# Patient Record
Sex: MALE | Race: WHITE | Employment: FULL TIME | URBAN - NONMETROPOLITAN AREA
[De-identification: names, ages, dates, MRNs, and addresses within clinical notes are randomized per-mention and may not be internally consistent; named-entity substitution may affect disease eponyms.]

---

## 2019-06-04 ENCOUNTER — OFFICE VISIT (OUTPATIENT)
Dept: FAMILY MEDICINE CLINIC | Age: 29
End: 2019-06-04
Payer: COMMERCIAL

## 2019-06-04 VITALS
OXYGEN SATURATION: 99 % | HEIGHT: 70 IN | HEART RATE: 67 BPM | WEIGHT: 187 LBS | TEMPERATURE: 98.4 F | RESPIRATION RATE: 16 BRPM | BODY MASS INDEX: 26.77 KG/M2 | SYSTOLIC BLOOD PRESSURE: 120 MMHG | DIASTOLIC BLOOD PRESSURE: 84 MMHG

## 2019-06-04 DIAGNOSIS — Z13.220 SCREENING CHOLESTEROL LEVEL: ICD-10-CM

## 2019-06-04 DIAGNOSIS — G47.9 DISTURBANCE IN SLEEP BEHAVIOR: ICD-10-CM

## 2019-06-04 DIAGNOSIS — L40.9 PSORIASIS: ICD-10-CM

## 2019-06-04 DIAGNOSIS — L40.9 PSORIASIS: Primary | ICD-10-CM

## 2019-06-04 DIAGNOSIS — F11.11 H/O OPIOID ABUSE (HCC): ICD-10-CM

## 2019-06-04 LAB
ALBUMIN SERPL-MCNC: 5.3 G/DL (ref 3.5–5.2)
ALP BLD-CCNC: 73 U/L (ref 40–130)
ALT SERPL-CCNC: 46 U/L (ref 5–41)
ANION GAP SERPL CALCULATED.3IONS-SCNC: 16 MMOL/L (ref 7–19)
AST SERPL-CCNC: 28 U/L (ref 5–40)
BASOPHILS ABSOLUTE: 0.1 K/UL (ref 0–0.2)
BASOPHILS RELATIVE PERCENT: 0.8 % (ref 0–1)
BILIRUB SERPL-MCNC: 0.5 MG/DL (ref 0.2–1.2)
BUN BLDV-MCNC: 13 MG/DL (ref 6–20)
CALCIUM SERPL-MCNC: 10.1 MG/DL (ref 8.6–10)
CHLORIDE BLD-SCNC: 101 MMOL/L (ref 98–111)
CHOLESTEROL, TOTAL: 265 MG/DL (ref 160–199)
CO2: 25 MMOL/L (ref 22–29)
CREAT SERPL-MCNC: 0.9 MG/DL (ref 0.5–1.2)
EOSINOPHILS ABSOLUTE: 0 K/UL (ref 0–0.6)
EOSINOPHILS RELATIVE PERCENT: 0.4 % (ref 0–5)
GFR NON-AFRICAN AMERICAN: >60
GLUCOSE BLD-MCNC: 87 MG/DL (ref 74–109)
HCT VFR BLD CALC: 45.2 % (ref 42–52)
HDLC SERPL-MCNC: 64 MG/DL (ref 55–121)
HEMOGLOBIN: 14.6 G/DL (ref 14–18)
LDL CHOLESTEROL CALCULATED: 188 MG/DL
LYMPHOCYTES ABSOLUTE: 1.7 K/UL (ref 1.1–4.5)
LYMPHOCYTES RELATIVE PERCENT: 18.1 % (ref 20–40)
MCH RBC QN AUTO: 28.3 PG (ref 27–31)
MCHC RBC AUTO-ENTMCNC: 32.3 G/DL (ref 33–37)
MCV RBC AUTO: 87.8 FL (ref 80–94)
MONOCYTES ABSOLUTE: 0.6 K/UL (ref 0–0.9)
MONOCYTES RELATIVE PERCENT: 6 % (ref 0–10)
NEUTROPHILS ABSOLUTE: 7 K/UL (ref 1.5–7.5)
NEUTROPHILS RELATIVE PERCENT: 74.3 % (ref 50–65)
PDW BLD-RTO: 12.9 % (ref 11.5–14.5)
PLATELET # BLD: 247 K/UL (ref 130–400)
PMV BLD AUTO: 10.1 FL (ref 9.4–12.4)
POTASSIUM SERPL-SCNC: 4 MMOL/L (ref 3.5–5)
RBC # BLD: 5.15 M/UL (ref 4.7–6.1)
SODIUM BLD-SCNC: 142 MMOL/L (ref 136–145)
TOTAL PROTEIN: 8.1 G/DL (ref 6.6–8.7)
TRIGL SERPL-MCNC: 66 MG/DL (ref 0–149)
WBC # BLD: 9.5 K/UL (ref 4.8–10.8)

## 2019-06-04 PROCEDURE — 99204 OFFICE O/P NEW MOD 45 MIN: CPT | Performed by: FAMILY MEDICINE

## 2019-06-04 RX ORDER — CLOBETASOL PROPIONATE 0.5 MG/G
CREAM TOPICAL
Qty: 1 TUBE | Refills: 2 | Status: SHIPPED | OUTPATIENT
Start: 2019-06-04 | End: 2020-01-16

## 2019-06-04 RX ORDER — M-VIT,TX,IRON,MINS/CALC/FOLIC 27MG-0.4MG
1 TABLET ORAL DAILY
COMMUNITY

## 2019-06-04 RX ORDER — TRAZODONE HYDROCHLORIDE 50 MG/1
TABLET ORAL
Qty: 60 TABLET | Refills: 2 | Status: SHIPPED | OUTPATIENT
Start: 2019-06-04 | End: 2022-03-24

## 2019-06-04 SDOH — HEALTH STABILITY: MENTAL HEALTH: HOW OFTEN DO YOU HAVE A DRINK CONTAINING ALCOHOL?: 2-4 TIMES A MONTH

## 2019-06-04 ASSESSMENT — PATIENT HEALTH QUESTIONNAIRE - PHQ9
1. LITTLE INTEREST OR PLEASURE IN DOING THINGS: 0
SUM OF ALL RESPONSES TO PHQ9 QUESTIONS 1 & 2: 0
2. FEELING DOWN, DEPRESSED OR HOPELESS: 0
SUM OF ALL RESPONSES TO PHQ QUESTIONS 1-9: 0
SUM OF ALL RESPONSES TO PHQ QUESTIONS 1-9: 0

## 2019-06-04 ASSESSMENT — ENCOUNTER SYMPTOMS
COUGH: 0
BACK PAIN: 0
EYE PAIN: 0
DIARRHEA: 0
VOMITING: 0
EYE DISCHARGE: 0
ABDOMINAL PAIN: 0
NAUSEA: 0
SHORTNESS OF BREATH: 0
RHINORRHEA: 0
CONSTIPATION: 0

## 2019-06-04 NOTE — PATIENT INSTRUCTIONS
sunburns can trigger psoriasis. Use sunscreen on areas of your skin that do not have psoriasis. Make sure to use a broad-spectrum sunscreen that has a sun protection factor (SPF) of 30 or higher. Use it every day, even when it is cloudy. ? Take care to avoid accidents such as cutting or scraping your skin. An injury to the skin can cause psoriasis patches to form anywhere on the body, including the area of the injury. ? Avoid tight shoes, clothing, watchbands, and hats. These may irritate your skin. ? Use a vaporizer or humidifier to add moisture to your bedroom. Follow the directions for cleaning the machine. · Try making one or more changes to your daily habits to help with managing your psoriasis. For example:  ? Try to control stress and anxiety. They may cause psoriasis to appear suddenly or can make symptoms worse. ? If you smoke, think about quitting. If you need help quitting, talk to your doctor about stop-smoking programs and medicines. ? If you drink, limit or reduce the amount of alcohol you drink. ? If you are overweight, see if you can lose some weight. · Seek support from family and friends. Talk to a counselor or other professional if you feel sad about your condition and need more help. When should you call for help? Call your doctor now or seek immediate medical care if:    · You have signs of infection, such as:  ? Increased pain, swelling, warmth, or redness. ? Red streaks leading from the area. ? Pus draining from the area. ? A fever.    Watch closely for changes in your health, and be sure to contact your doctor if:    · You have swelling, stiffness, or pain in your joints.     · You do not get better as expected. Where can you learn more? Go to https://[employer]. org and sign in to your [employer] account. Enter F608 in the Ambassador box to learn more about \"Psoriasis: Care Instructions. \"     If you do not have an account, please click on the \"Sign Up Now\" link. Current as of: April 17, 2018  Content Version: 12.0  © 0498-3686 [employer]wise, Incorporated. Care instructions adapted under license by [facility] ([facility]). If you have questions about a medical condition or this instruction, always ask your healthcare professional. [address] any warranty or liability for your use of this information.

## 2019-06-04 NOTE — PROGRESS NOTES
Pamela Bales is a 26 y.o. male who presents today for   Chief Complaint   Patient presents with   Tara Oconnell     denies problems. wants lab       Chief Complaint: Establish care    HPI  Patient reports that he is here to establish care. He doesn't currently have a lot of medical problems at this time and is taking some vitamins but otherwise is not taking any medications. He does have history of psoriasis and has previously been seen by dermatology, Juliette Vanessa in [place]. He is not currently taking any treatment for it as he only has problems with his knees and elbows and otherwise is not having any other problems with the psoriasis. He is unsure what treatments he has had in the past but is not interested in the injectable or immune modulator medications at this time. He does report these been having more problems sleeping over the past month and reports that on average of get somewhere between 4 and 6 hours of sleep at night. He states that he will occasionally have problems falling asleep as well as staying asleep. There are nights where he'll sleeps sporadically and only get about an hour or so several times throughout the night. He denies any specific changes are contributing factors to his sleep but does note that he started after he stopped taking oxycodone/pain medicine. He does report that previously had a problem with pain medication. He states that he believes it started after injury/surgery but didn't seek help and is currently going to counseling with Alyssia at [facility] in [place]. He states that his been completely clean for the past month. Review of Systems   Constitutional: Negative for activity change, appetite change, fatigue and fever. HENT: Negative for congestion and rhinorrhea. Eyes: Negative for pain and discharge. Respiratory: Negative for cough and shortness of breath. Cardiovascular: Negative for chest pain and palpitations.    Gastrointestinal: Negative for abdominal pain, constipation, diarrhea, nausea and vomiting. Endocrine: Negative for cold intolerance and heat intolerance. Genitourinary: Negative for dysuria and hematuria. Musculoskeletal: Negative for back pain, gait problem and neck pain. Skin: Positive for rash. Negative for wound. Neurological: Negative for syncope and weakness. Hematological: Negative for adenopathy. Does not bruise/bleed easily. Psychiatric/Behavioral: Positive for sleep disturbance. Negative for dysphoric mood. The patient is not nervous/anxious. History reviewed. No pertinent past medical history. Current Outpatient Medications   Medication Sig Dispense Refill    Multiple Vitamins-Minerals (THERAPEUTIC MULTIVITAMIN-MINERALS) tablet Take 1 tablet by mouth daily      Vit B6-Vit B12-Omega 3 Acids (OMEGA-3 2100+B PO) Take 3 capsules by mouth daily Omega b stress vitamin      clobetasol (TEMOVATE) 0.05 % cream Apply topically 2 times daily. 1 Tube 2    traZODone (DESYREL) 50 MG tablet 1-2 tablets at bedtime as needed for sleep. 60 tablet 2     No current facility-administered medications for this visit. No Known Allergies    Past Surgical History:   Procedure Laterality Date    ANTERIOR CRUCIATE LIGAMENT REPAIR Left     WISDOM TOOTH EXTRACTION         Social History     Tobacco Use    Smoking status: Never Smoker    Smokeless tobacco: Current User   Substance Use Topics    Alcohol use: Yes     Frequency: 2-4 times a month    Drug use: Not on file       Family History   Problem Relation Age of Onset    Thyroid Disease Mother     Atrial Fibrillation Father     Cancer Other         aunt and uncle colon cancer       /84 (Site: Right Upper Arm, Position: Sitting, Cuff Size: Medium Adult)   Pulse 67   Temp 98.4 °F (36.9 °C) (Oral)   Resp 16   Ht 5' 10\" (1.778 m)   Wt 187 lb (84.8 kg)   SpO2 99%   BMI 26.83 kg/m²     Physical Exam   Constitutional: He is oriented to person, place, and time.  He appears well-developed and well-nourished. No distress. HENT:   Head: Normocephalic and atraumatic. Right Ear: External ear normal.   Left Ear: External ear normal.   Nose: Nose normal.   Eyes: Conjunctivae and EOM are normal. Right eye exhibits no discharge. Left eye exhibits no discharge. No scleral icterus. Neck: Normal range of motion. Neck supple. No tracheal deviation present. No thyromegaly present. Cardiovascular: Normal rate, regular rhythm, normal heart sounds and intact distal pulses. Exam reveals no gallop and no friction rub. No murmur heard. Pulmonary/Chest: Effort normal and breath sounds normal. No respiratory distress. He has no wheezes. He has no rales. Abdominal: Soft. Bowel sounds are normal. He exhibits no distension. There is no tenderness. Musculoskeletal: Normal range of motion. He exhibits no edema (Bilateral lower extremities) or deformity (No gross deformities of upper or lower extremities). Lymphadenopathy:     He has no cervical adenopathy. Neurological: He is alert and oriented to person, place, and time. No cranial nerve deficit. Skin: Skin is warm and dry. Rash noted. He is not diaphoretic. No erythema. Psoriatic rash noted on bilateral elbows and knees. The plaques are fairly localized but are thick in appearance. Psychiatric: He has a normal mood and affect. His behavior is normal. Thought content normal.   Nursing note and vitals reviewed. Assessment:       ICD-10-CM    1. Psoriasis L40.9 clobetasol (TEMOVATE) 0.05 % cream     CBC Auto Differential     Comprehensive Metabolic Panel    Discussed management options and potential trials of medication. Discussed possibility of getting set back up with specialist at this time is wishing to attempt topical treatments or homeopathic remedies and efforts to manage his rash. Discussed recommended course of high-dose topical steroids and recommendation for avoiding prolonged usage greater than 2 weeks.  We'll continue to monitor and adjust as course dictates. 2. Disturbance in sleep behavior G47.9 traZODone (DESYREL) 50 MG tablet     CBC Auto Differential     Comprehensive Metabolic Panel    Discussed proper use of medication and potential side effects. Discussed some self adjustments of medicine. Discussed lifestyle modifications that may be to improve his sleep. We'll continue to monitor and adjust as needed. 3. Screening cholesterol level Z13.220 Lipid Panel   4. H/O opioid abuse Z87.898 Stress importance of maintaining his weekly follow-up with counseling. We'll continue to monitor and assist as needed. Plan:  Discussed diagnosis, expected course, and proper use of medication, including OTC medications. Discussed signs and symptoms requiring medical attention. All questions were answered and patient voiced understanding and agreement with plan as discussed. Orders Placed This Encounter   Procedures    CBC Auto Differential     Standing Status:   Future     Number of Occurrences:   1     Standing Expiration Date:   6/3/2020    Comprehensive Metabolic Panel     Standing Status:   Future     Number of Occurrences:   1     Standing Expiration Date:   6/3/2020    Lipid Panel     Standing Status:   Future     Number of Occurrences:   1     Standing Expiration Date:   6/3/2020     Order Specific Question:   Is Patient Fasting?/# of Hours     Answer:   yes     Orders Placed This Encounter   Medications    clobetasol (TEMOVATE) 0.05 % cream     Sig: Apply topically 2 times daily. Dispense:  1 Tube     Refill:  2    traZODone (DESYREL) 50 MG tablet     Si-2 tablets at bedtime as needed for sleep. Dispense:  60 tablet     Refill:  2     There are no discontinued medications. Patient Instructions   Patient given educational handouts and has had all questions answered. Patient voices understanding and agrees to plans along with risks and benefits of plan.  Patient is instructed to continue prior meds,diet, and exercise plans as instructed. Patient agrees to follow up as instructed and sooner if needed. Patient agrees to go to ER if condition becomes emergent. Return if symptoms worsen or fail to improve, for next scheduled follow up with PCP.

## 2020-01-16 RX ORDER — CLOBETASOL PROPIONATE 0.5 MG/G
CREAM TOPICAL
Qty: 15 G | Refills: 2 | Status: SHIPPED | OUTPATIENT
Start: 2020-01-16 | End: 2020-05-19

## 2020-05-19 RX ORDER — CLOBETASOL PROPIONATE 0.5 MG/G
CREAM TOPICAL
Qty: 15 G | Refills: 2 | Status: SHIPPED | OUTPATIENT
Start: 2020-05-19 | End: 2021-02-15

## 2020-10-19 ENCOUNTER — HOSPITAL ENCOUNTER (INPATIENT)
Age: 30
LOS: 3 days | Discharge: HOME | DRG: 386 | End: 2020-10-22
Attending: EMERGENCY MEDICINE | Admitting: STUDENT IN AN ORGANIZED HEALTH CARE EDUCATION/TRAINING PROGRAM
Payer: COMMERCIAL

## 2020-10-19 ENCOUNTER — APPOINTMENT (OUTPATIENT)
Dept: CT IMAGING | Age: 30
DRG: 386 | End: 2020-10-19
Payer: COMMERCIAL

## 2020-10-19 ENCOUNTER — NURSE TRIAGE (OUTPATIENT)
Dept: OTHER | Facility: CLINIC | Age: 30
End: 2020-10-19

## 2020-10-19 PROBLEM — K52.9 COLITIS: Status: ACTIVE | Noted: 2020-10-19

## 2020-10-19 PROBLEM — K62.5 RECTAL BLEEDING: Status: ACTIVE | Noted: 2020-10-19

## 2020-10-19 PROBLEM — K35.80 ACUTE APPENDICITIS: Status: ACTIVE | Noted: 2020-10-19

## 2020-10-19 LAB
ABO/RH: NORMAL
ALBUMIN SERPL-MCNC: 4.4 G/DL (ref 3.5–5.2)
ALP BLD-CCNC: 89 U/L (ref 40–130)
ALT SERPL-CCNC: 38 U/L (ref 5–41)
ANION GAP SERPL CALCULATED.3IONS-SCNC: 11 MMOL/L (ref 7–19)
ANTIBODY SCREEN: NORMAL
AST SERPL-CCNC: 29 U/L (ref 5–40)
BASOPHILS ABSOLUTE: 0.1 K/UL (ref 0–0.2)
BASOPHILS RELATIVE PERCENT: 0.8 % (ref 0–1)
BILIRUB SERPL-MCNC: 0.5 MG/DL (ref 0.2–1.2)
BUN BLDV-MCNC: 10 MG/DL (ref 6–20)
C DIFF TOXIN/ANTIGEN: NORMAL
CALCIUM SERPL-MCNC: 9.8 MG/DL (ref 8.6–10)
CHLORIDE BLD-SCNC: 102 MMOL/L (ref 98–111)
CO2: 26 MMOL/L (ref 22–29)
CREAT SERPL-MCNC: 1 MG/DL (ref 0.5–1.2)
EOSINOPHILS ABSOLUTE: 0.3 K/UL (ref 0–0.6)
EOSINOPHILS RELATIVE PERCENT: 4.2 % (ref 0–5)
GFR AFRICAN AMERICAN: >59
GFR NON-AFRICAN AMERICAN: >60
GLUCOSE BLD-MCNC: 90 MG/DL (ref 74–109)
HCT VFR BLD CALC: 41.1 % (ref 42–52)
HEMOGLOBIN: 13.6 G/DL (ref 14–18)
IMMATURE GRANULOCYTES #: 0 K/UL
INR BLD: 0.95 (ref 0.88–1.18)
LACTOFERRIN, FECAL: POSITIVE
LYMPHOCYTES ABSOLUTE: 1.1 K/UL (ref 1.1–4.5)
LYMPHOCYTES RELATIVE PERCENT: 14.9 % (ref 20–40)
MCH RBC QN AUTO: 29.6 PG (ref 27–31)
MCHC RBC AUTO-ENTMCNC: 33.1 G/DL (ref 33–37)
MCV RBC AUTO: 89.3 FL (ref 80–94)
MONOCYTES ABSOLUTE: 1 K/UL (ref 0–0.9)
MONOCYTES RELATIVE PERCENT: 12.8 % (ref 0–10)
NEUTROPHILS ABSOLUTE: 5.1 K/UL (ref 1.5–7.5)
NEUTROPHILS RELATIVE PERCENT: 67 % (ref 50–65)
PDW BLD-RTO: 14.8 % (ref 11.5–14.5)
PLATELET # BLD: 188 K/UL (ref 130–400)
PMV BLD AUTO: 9 FL (ref 9.4–12.4)
POTASSIUM REFLEX MAGNESIUM: 3.9 MMOL/L (ref 3.5–5)
PROTHROMBIN TIME: 12.6 SEC (ref 12–14.6)
RBC # BLD: 4.6 M/UL (ref 4.7–6.1)
SARS-COV-2, NAAT: NOT DETECTED
SODIUM BLD-SCNC: 139 MMOL/L (ref 136–145)
TOTAL PROTEIN: 7.3 G/DL (ref 6.6–8.7)
WBC # BLD: 7.6 K/UL (ref 4.8–10.8)

## 2020-10-19 PROCEDURE — 86850 RBC ANTIBODY SCREEN: CPT

## 2020-10-19 PROCEDURE — 80053 COMPREHEN METABOLIC PANEL: CPT

## 2020-10-19 PROCEDURE — 2580000003 HC RX 258: Performed by: STUDENT IN AN ORGANIZED HEALTH CARE EDUCATION/TRAINING PROGRAM

## 2020-10-19 PROCEDURE — 1210000000 HC MED SURG R&B

## 2020-10-19 PROCEDURE — 6360000002 HC RX W HCPCS: Performed by: NURSE PRACTITIONER

## 2020-10-19 PROCEDURE — 6370000000 HC RX 637 (ALT 250 FOR IP): Performed by: STUDENT IN AN ORGANIZED HEALTH CARE EDUCATION/TRAINING PROGRAM

## 2020-10-19 PROCEDURE — 87324 CLOSTRIDIUM AG IA: CPT

## 2020-10-19 PROCEDURE — 6360000002 HC RX W HCPCS: Performed by: STUDENT IN AN ORGANIZED HEALTH CARE EDUCATION/TRAINING PROGRAM

## 2020-10-19 PROCEDURE — 86901 BLOOD TYPING SEROLOGIC RH(D): CPT

## 2020-10-19 PROCEDURE — 99284 EMERGENCY DEPT VISIT MOD MDM: CPT

## 2020-10-19 PROCEDURE — 6360000004 HC RX CONTRAST MEDICATION: Performed by: NURSE PRACTITIONER

## 2020-10-19 PROCEDURE — U0002 COVID-19 LAB TEST NON-CDC: HCPCS

## 2020-10-19 PROCEDURE — 99999 PR OFFICE/OUTPT VISIT,PROCEDURE ONLY: CPT | Performed by: NURSE PRACTITIONER

## 2020-10-19 PROCEDURE — 86900 BLOOD TYPING SEROLOGIC ABO: CPT

## 2020-10-19 PROCEDURE — 87449 NOS EACH ORGANISM AG IA: CPT

## 2020-10-19 PROCEDURE — 87015 SPECIMEN INFECT AGNT CONCNTJ: CPT

## 2020-10-19 PROCEDURE — 96374 THER/PROPH/DIAG INJ IV PUSH: CPT

## 2020-10-19 PROCEDURE — 87045 FECES CULTURE AEROBIC BACT: CPT

## 2020-10-19 PROCEDURE — 99253 IP/OBS CNSLTJ NEW/EST LOW 45: CPT | Performed by: SURGERY

## 2020-10-19 PROCEDURE — 36415 COLL VENOUS BLD VENIPUNCTURE: CPT

## 2020-10-19 PROCEDURE — 2580000003 HC RX 258: Performed by: NURSE PRACTITIONER

## 2020-10-19 PROCEDURE — 74177 CT ABD & PELVIS W/CONTRAST: CPT

## 2020-10-19 PROCEDURE — 87427 SHIGA-LIKE TOXIN AG IA: CPT

## 2020-10-19 PROCEDURE — 87899 AGENT NOS ASSAY W/OPTIC: CPT

## 2020-10-19 PROCEDURE — 85610 PROTHROMBIN TIME: CPT

## 2020-10-19 PROCEDURE — 85025 COMPLETE CBC W/AUTO DIFF WBC: CPT

## 2020-10-19 PROCEDURE — 83630 LACTOFERRIN FECAL (QUAL): CPT

## 2020-10-19 RX ORDER — POTASSIUM CHLORIDE 7.45 MG/ML
10 INJECTION INTRAVENOUS PRN
Status: DISCONTINUED | OUTPATIENT
Start: 2020-10-19 | End: 2020-10-22 | Stop reason: HOSPADM

## 2020-10-19 RX ORDER — POTASSIUM CHLORIDE 20 MEQ/1
40 TABLET, EXTENDED RELEASE ORAL PRN
Status: DISCONTINUED | OUTPATIENT
Start: 2020-10-19 | End: 2020-10-22 | Stop reason: HOSPADM

## 2020-10-19 RX ORDER — ACETAMINOPHEN 650 MG/1
650 SUPPOSITORY RECTAL EVERY 6 HOURS PRN
Status: DISCONTINUED | OUTPATIENT
Start: 2020-10-19 | End: 2020-10-22 | Stop reason: HOSPADM

## 2020-10-19 RX ORDER — MAGNESIUM SULFATE IN WATER 40 MG/ML
2 INJECTION, SOLUTION INTRAVENOUS PRN
Status: DISCONTINUED | OUTPATIENT
Start: 2020-10-19 | End: 2020-10-22 | Stop reason: HOSPADM

## 2020-10-19 RX ORDER — ONDANSETRON 2 MG/ML
4 INJECTION INTRAMUSCULAR; INTRAVENOUS ONCE
Status: DISCONTINUED | OUTPATIENT
Start: 2020-10-19 | End: 2020-10-22 | Stop reason: HOSPADM

## 2020-10-19 RX ORDER — ACETAMINOPHEN 325 MG/1
650 TABLET ORAL EVERY 6 HOURS PRN
Status: DISCONTINUED | OUTPATIENT
Start: 2020-10-19 | End: 2020-10-22 | Stop reason: HOSPADM

## 2020-10-19 RX ORDER — SODIUM CHLORIDE 9 MG/ML
INJECTION, SOLUTION INTRAVENOUS CONTINUOUS
Status: DISCONTINUED | OUTPATIENT
Start: 2020-10-19 | End: 2020-10-22 | Stop reason: HOSPADM

## 2020-10-19 RX ORDER — PROMETHAZINE HYDROCHLORIDE 25 MG/1
12.5 TABLET ORAL EVERY 6 HOURS PRN
Status: DISCONTINUED | OUTPATIENT
Start: 2020-10-19 | End: 2020-10-22 | Stop reason: HOSPADM

## 2020-10-19 RX ORDER — TRAZODONE HYDROCHLORIDE 50 MG/1
50 TABLET ORAL NIGHTLY PRN
Status: DISCONTINUED | OUTPATIENT
Start: 2020-10-19 | End: 2020-10-22 | Stop reason: HOSPADM

## 2020-10-19 RX ORDER — 0.9 % SODIUM CHLORIDE 0.9 %
1000 INTRAVENOUS SOLUTION INTRAVENOUS ONCE
Status: COMPLETED | OUTPATIENT
Start: 2020-10-19 | End: 2020-10-19

## 2020-10-19 RX ORDER — MORPHINE SULFATE 4 MG/ML
4 INJECTION, SOLUTION INTRAMUSCULAR; INTRAVENOUS ONCE
Status: DISCONTINUED | OUTPATIENT
Start: 2020-10-19 | End: 2020-10-19

## 2020-10-19 RX ORDER — SODIUM CHLORIDE 0.9 % (FLUSH) 0.9 %
10 SYRINGE (ML) INJECTION PRN
Status: DISCONTINUED | OUTPATIENT
Start: 2020-10-19 | End: 2020-10-22 | Stop reason: HOSPADM

## 2020-10-19 RX ORDER — ONDANSETRON 2 MG/ML
4 INJECTION INTRAMUSCULAR; INTRAVENOUS EVERY 6 HOURS PRN
Status: DISCONTINUED | OUTPATIENT
Start: 2020-10-19 | End: 2020-10-22 | Stop reason: HOSPADM

## 2020-10-19 RX ORDER — SODIUM CHLORIDE 0.9 % (FLUSH) 0.9 %
10 SYRINGE (ML) INJECTION EVERY 12 HOURS SCHEDULED
Status: DISCONTINUED | OUTPATIENT
Start: 2020-10-19 | End: 2020-10-22 | Stop reason: HOSPADM

## 2020-10-19 RX ADMIN — IOPAMIDOL 90 ML: 755 INJECTION, SOLUTION INTRAVENOUS at 11:32

## 2020-10-19 RX ADMIN — TRAZODONE HYDROCHLORIDE 50 MG: 50 TABLET ORAL at 22:22

## 2020-10-19 RX ADMIN — PIPERACILLIN AND TAZOBACTAM 4.5 G: 4; .5 INJECTION, POWDER, LYOPHILIZED, FOR SOLUTION INTRAVENOUS at 22:23

## 2020-10-19 RX ADMIN — SODIUM CHLORIDE 1000 ML: 9 INJECTION, SOLUTION INTRAVENOUS at 11:39

## 2020-10-19 RX ADMIN — MEROPENEM 1 G: 1 INJECTION, POWDER, FOR SOLUTION INTRAVENOUS at 12:58

## 2020-10-19 RX ADMIN — SODIUM CHLORIDE: 9 INJECTION, SOLUTION INTRAVENOUS at 14:38

## 2020-10-19 RX ADMIN — PIPERACILLIN AND TAZOBACTAM 4.5 G: 4; .5 INJECTION, POWDER, LYOPHILIZED, FOR SOLUTION INTRAVENOUS at 14:38

## 2020-10-19 RX ADMIN — SODIUM CHLORIDE, PRESERVATIVE FREE 10 ML: 5 INJECTION INTRAVENOUS at 22:23

## 2020-10-19 ASSESSMENT — ENCOUNTER SYMPTOMS
BLOOD IN STOOL: 1
NAUSEA: 0
COUGH: 0
DIARRHEA: 1
RHINORRHEA: 0
BACK PAIN: 0
EYE REDNESS: 0
ABDOMINAL DISTENTION: 1
WHEEZING: 0
VOMITING: 0
ABDOMINAL PAIN: 1
CONSTIPATION: 1
EYE PAIN: 0
SORE THROAT: 0
SHORTNESS OF BREATH: 0

## 2020-10-19 ASSESSMENT — PAIN SCALES - GENERAL: PAINLEVEL_OUTOF10: 3

## 2020-10-19 NOTE — H&P
Hospitalist: History and Physical    Date: 10/19/2020 Time: 1:08 PM    Name: Florinda Thomas : 1990 MRN: [id number]    Code Status: No Order No additional code details    PCP: Brooke Garcia MD    Patient's Chief Complaint Is: Rectal bleed    HPI: Patient is a 31 y.o. male with previous history of infectious diarrhea with E. coli over 10 years ago when he was in college, thought related to drinking some well water. He tells me this episode of infectious diarrhea resolved fairly quickly. during that time he did not have any significant GI hemorrhage or kidney dysfunction per report. Patient presented to [facility] ED due to one week of rectal bleeding with bright red blood per rectum, with associated loose stools and diarrhea, however he notes that this significantly worsened over the past 2 days with more bloody diarrhea and abdominal pain worse with bowel movements. Recently traveled to [facility] about 3 weeks ago and had seafood prior to development of symptoms. He also traveled to [place] in [address] [place] [address] more recently just over a week ago and had seafood there. he denies any nausea, vomiting or hematemesis. No melanotic stools. Uses dip tobacco does not smoke tobacco.  He did take about 4 naproxen tablets yesterday due to pain, but otherwise denies taking any other NSAIDs more frequently over the past 1-2 weeks. He drinks alcohol occasionally in social setting but no recent binge drinking. He denies any recent fevers/chills, sweats, unintentional weight loss, fatigue, shortness of breath, chest pain, headaches, vision changes, dysuria or hematuria. He has no history of inflammatory bowel disease or hemorrhoids. No previous history of GI bleed. No previous abdominal surgeries. He has family history of 2 uncles that had colon cancer. In the ED, patient hemodynamically stable with normal heart rate and afebrile. Noted brown stool, but Hemoccult positive.   H&H stable, normal WBC and platelet count. Serum chemistry fairly unremarkable. Covid screen pending. INR normal.  CT abdomen/pelvis obtained showed concern for acute appendicitis as well as significant colitis in distal descending, sigmoid colon and rectum. General surgery was contacted initially by ED physician who reviewed imaging and will evaluate patient. General surgery notes more concern regarding colitis, and no urgent surgical intervention needed regarding appendicitis at this time but will follow. GI consulted given rectal bleeding. History reviewed. No pertinent past medical history.   Past Surgical History:   Procedure Laterality Date    ANTERIOR CRUCIATE LIGAMENT REPAIR Left     WISDOM TOOTH EXTRACTION       Family History   Problem Relation Age of Onset    Thyroid Disease Mother     Atrial Fibrillation Father     Cancer Other         aunt and uncle colon cancer     Social History     Socioeconomic History    Marital status: Single     Spouse name: Not on file    Number of children: Not on file    Years of education: Not on file    Highest education level: Not on file   Occupational History    Not on file   Social Needs    Financial resource strain: Not on file    Food insecurity     Worry: Not on file     Inability: Not on file    Transportation needs     Medical: Not on file     Non-medical: Not on file   Tobacco Use    Smoking status: Never Smoker    Smokeless tobacco: Current User     Types: Chew   Substance and Sexual Activity    Alcohol use: Yes     Frequency: 2-4 times a month    Drug use: Never    Sexual activity: Not on file   Lifestyle    Physical activity     Days per week: Not on file     Minutes per session: Not on file    Stress: Not on file   Relationships    Social connections     Talks on phone: Not on file     Gets together: Not on file     Attends Spiritism service: Not on file     Active member of club or organization: Not on file     Attends meetings of clubs or organizations: Not on file     Relationship status: Not on file    Intimate partner violence     Fear of current or ex partner: Not on file     Emotionally abused: Not on file     Physically abused: Not on file     Forced sexual activity: Not on file   Other Topics Concern    Not on file   Social History Narrative    Not on file     No Known Allergies  Prior to Admission medications    Medication Sig Start Date End Date Taking? Authorizing Provider   clobetasol (TEMOVATE) 0.05 % cream APPLY TOPICALLY 2 TIMES DAILY. 5/19/20  Yes Branden Denny MD   traZODone (DESYREL) 50 MG tablet 1-2 tablets at bedtime as needed for sleep. 6/4/19  Yes Branden Denny MD   Multiple Vitamins-Minerals (THERAPEUTIC MULTIVITAMIN-MINERALS) tablet Take 1 tablet by mouth daily    Historical Provider, MD   Vit B6-Vit B12-Omega 3 Acids (OMEGA-3 2100+B PO) Take 3 capsules by mouth daily Omega b stress vitamin    Historical Provider, MD DENISE have reviewed all pertinent history. Prior medical records and laboratory evaluation reviewed. Imaging independently reviewed. Review of Systems:   As per HPI, otherwise all other ROS performed and found to be negative at this time. Physical Exam:  Vitals:    10/19/20 1301   BP: 119/66   Pulse: 66   Resp: 16   Temp:    SpO2: 95%     CONSTITUTIONAL: Appears well developed and nourished, in some pain but no distress  PSYCH: Judgement and insight are normal. Mental status alert and oriented to person, place and time. Mood and affect are normal  EYES: DAVION, symmetrical. Conjunctiva are moist, non-icteric. Lids are normal  ENT: Throat: Lips are normal. Oral mucosa is pink and moist.   NECK: Trachea is midline. LYMPHATIC: No anterior cervical or axillary lymphadenopathy     LUNGS: Normal respiratory effort, CTAB  CARDIOVASCULAR: Regular rate and rhythm, Pulses are equal bilaterally. GI/ABDOMEN: Soft, mild bilateral lower quadrant tenderness w/o guarding/rebound, BS+  SKIN: Warm and dry.    NEUROLOGICAL: no focal ABDOMEN PELVIS W IV CONTRAST 10/19/2020 10:31 AM History: Bilateral lower abdominal pain. DLP: 1455 mGycm. The CT scan of the abdomen and pelvis is performed after intravenous contrast enhancement. The images are acquired in axial plane with subsequent reconstruction in coronal and sagittal planes. There is no previous study for comparison. The lung bases included in the study are normal. The liver and spleen are normal. No radiopaque gallstones. Moderately prominent phrygian cap is seen. The common bile duct is nondilated. The pancreas is normal. Pancreatic duct is not dilated. The adrenal glands bilaterally are normal. The pain is located bilaterally are normal. No radiopaque calculi. No hydronephrosis. The visualized ureters bilaterally appear normal. The urinary bladder is incompletely distended with moderate diffuse/symmetrical thickening of the wall. No intrinsic abnormality. The prostate is not enlarged. The stomach and duodenum are normal. The small bowel is nondistended. A retrocecal appendix seen with surrounding peritoneal infiltration predominantly of the distal part of the appendix suggesting acute appendicitis. There is surrounding retroperitoneal fat infiltration. No fluid. No free air there is moderate gas and stool in the colon more pronounced in the proximal colon. There is marked thickening of the wall of the distal descending colon, sigmoid colon and rectum. Surrounding retroperitoneal fat infiltration and nodularity. There is increased vascularity and thickening and enhancement of the wall and mucosa of the colon in this area. No free fluid in the pelvis. Normal abdominal aorta and iliac arteries. There is no evidence of abdominal or pelvic lymphadenopathy. The images reviewed in bone window show no acute bony abnormality. Acute appendicitis. Acute inflammatory process involving the distal descending colon, sigmoid colon and rectum.  The above results were immediately called to the emergency room and given to Dr. Damien Sue. The above findings are recorded on a digital voice clip in PACS. Signed by Dr Sarah Sher on 10/19/2020 12:08 PM      Assessment/Plan:     Principal Problem:    Colitis  Active Problems:    Acute appendicitis    Rectal bleeding  Resolved Problems:    * No resolved hospital problems. *        Colitis, involving descending, sigmoid colon and rectum  --Broad spectrum coverage with Zosyn for now  --If diarrhea persists, will obtain C.  Diff toxin assay and stool studies    Appendicitis  - General Surgery will follow  -IV Abx as above    Lower GI bleed with rectal bleeding  --GI consult  --monitor H/H  --ensure adequate IV access    NPO until GI evaluation  Maintenance IVFs    DVT prophylaxis-  AC held due to bleed      Serene Setting  10/19/2020 1:08 PM

## 2020-10-19 NOTE — CONSULTS
Mr. Rosa Blandon is a 31year old male who presented to the ER with complaint of diarrhea and blood in his stool. He reports that he has been having diarrhea for about 3 weeks. About three weeks ago, he went to [place] for some fishing, and has been eating on fish and oysters since he got home. Over the past two weeks the diarrhea worsened, for about the past week he has had bright red blood in his stool, and for the past couple days he has had bilateral lower abdominal pain. He describes the pain as a discomfort, and really only hurts if someone presses on it. He started out having diarrhea a couple times a day. This then changed to as soon as he got up, having to have diarrhea 3-4 times in a row, and then a couple more times through the day. He reports that he has been taking imodium for about a week. He denies any fever or chills, no nausea or vomiting, he has been having a regular appetite. He did have e coli in college, and had similar symptoms. In the ER, he was found to have colitis as well as possible acute appendicitis on CT. History reviewed. No pertinent past medical history. Past Surgical History:   Procedure Laterality Date    ANTERIOR CRUCIATE LIGAMENT REPAIR Left     CYST REMOVAL      ON LP    WISDOM TOOTH EXTRACTION       No current facility-administered medications on file prior to encounter. Current Outpatient Medications on File Prior to Encounter   Medication Sig Dispense Refill    clobetasol (TEMOVATE) 0.05 % cream APPLY TOPICALLY 2 TIMES DAILY. 15 g 2    traZODone (DESYREL) 50 MG tablet 1-2 tablets at bedtime as needed for sleep. 60 tablet 2    Multiple Vitamins-Minerals (THERAPEUTIC MULTIVITAMIN-MINERALS) tablet Take 1 tablet by mouth daily       Allergies: Patient has no known allergies.     Family History   Problem Relation Age of Onset    Thyroid Disease Mother     Atrial Fibrillation Father     Cancer Other         aunt and uncle colon cancer       Social History     Tobacco Use    Smoking status: Never Smoker    Smokeless tobacco: Current User     Types: Chew   Substance Use Topics    Alcohol use: Yes     Frequency: 2-4 times a month     Review of Systems   Constitutional: Negative for activity change, appetite change and fever. HENT: Negative for congestion, rhinorrhea and sore throat. Eyes: Negative for pain, redness and visual disturbance. Respiratory: Negative for cough, shortness of breath and wheezing. Cardiovascular: Negative for chest pain, palpitations and leg swelling. Gastrointestinal: Positive for abdominal distention, abdominal pain, blood in stool, constipation and diarrhea. Negative for nausea and vomiting. Endocrine: Negative for polydipsia, polyphagia and polyuria. Genitourinary: Negative for dysuria, frequency and hematuria. Musculoskeletal: Negative for arthralgias, back pain and gait problem. Skin: Negative for rash and wound. Neurological: Negative for dizziness, seizures and headaches. Psychiatric/Behavioral: Negative for confusion and dysphoric mood. The patient is not nervous/anxious.       Physical Examination: General appearance - alert, well appearing, and in no distress  Mental status - normal behavior and mood  Eyes - pupils equal and reactive, extraocular eye movements intact  Neck - supple, no significant adenopathy  Chest - no tachypnea, retractions or cyanosis  Heart - normal rate and regular rhythm  Abdomen - soft, mild distention, mild TTP lower abdomen  Neurological - cranial nerves II through XII grossly intact  Extremities - no pedal edema noted    CBC:   Lab Results   Component Value Date    WBC 7.6 10/19/2020    RBC 4.60 10/19/2020    HGB 13.6 10/19/2020    HCT 41.1 10/19/2020    MCV 89.3 10/19/2020    MCH 29.6 10/19/2020    MCHC 33.1 10/19/2020    RDW 14.8 10/19/2020     10/19/2020    MPV 9.0 10/19/2020     BMP:    Lab Results   Component Value Date     10/19/2020    K 3.9 10/19/2020     10/19/2020 CO2 26 10/19/2020    BUN 10 10/19/2020    LABALBU 4.4 10/19/2020    CREATININE 1.0 10/19/2020    CALCIUM 9.8 10/19/2020    GFRAA >59 10/19/2020    LABGLOM >60 10/19/2020    GLUCOSE 90 10/19/2020       Covid negative    Stool test pending    CT:  Impression:      Acute appendicitis. Acute inflammatory process involving the distal descending colon,   sigmoid colon and rectum. The above results were immediately called to the emergency room and   given to Dr. Tessa Weeks. The above findings are recorded on a digital voice clip in PACS. Signed by Dr Yari Dumont on 10/19/2020 12:08 PM      CT reviewed- patient with quite a bit of descending colon thickening, consistent with likely infectious etiology. Significant amount of stool backed up from area of wall thickening through cecum. Appendix mildly dilated, but air in the tip, no significant periappendiceal inflammation    Assessment and plan:  31year old male with colitis and blood in stool  1) Discussed with patient that I feel his current issues are really his colitis, and that the stool backing up, is likely what is causing his RLQ discomfort. Even though he has been having diarrhea, he has also been taking imodium, and there is obviously stool backed up that is not able to pass through the narrowed area. He is supposed to collect a stool sample when he is able to go to the bathroom. No further imodium. Continue fluids and IV abx. Continue NPO for tonight. Follow up GI recs. No plan for appendectomy at this point.

## 2020-10-19 NOTE — PROGRESS NOTES
Malorie Osman arrived to room # [phone]  Presented with: loose stools with blood  Mental Status: Patient is oriented and alert. Vitals:    10/19/20 1404   BP: 120/73   Pulse: 69   Resp: 18   Temp: 97.3 °F (36.3 °C)   SpO2: 97%     Patient safety contract and falls prevention contract reviewed with patient Yes. Oriented Patient and Family to room. Call light within reach. Yes.   Needs, issues or concerns expressed at this time: no.      Electronically signed by Akanksha Mario RN on 10/19/2020 at 6:30 PM

## 2020-10-19 NOTE — TELEPHONE ENCOUNTER
Diarrhea and abdominal pain starting 2 weeks ago. Worsening over the last week. He noticed blood in his diarrhea last week. Bright red blood and toilet fills with blood. Denies hemorrhoids. He has had 4 episodes this am and 4 yesterday with blood. Patient will go to ED now. Reason for Disposition   Bloody, black, or tarry bowel movements (Exception: chronic-unchanged black-grey bowel movements and is taking iron pills or Pepto-bismol)    Answer Assessment - Initial Assessment Questions  1. DIARRHEA SEVERITY: \"How bad is the diarrhea? \" \"How many extra stools have you had in the past 24 hours than normal?\"     - NO DIARRHEA (SCALE 0)    - MILD (SCALE 1-3): Few loose or mushy BMs; increase of 1-3 stools over normal daily number of stools; mild increase in ostomy output. -  MODERATE (SCALE 4-7): Increase of 4-6 stools daily over normal; moderate increase in ostomy output. * SEVERE (SCALE 8-10; OR 'WORST POSSIBLE'): Increase of 7 or more stools daily over normal; moderate increase in ostomy output; incontinence. Moderate. Averaging 4 a day the last couple days    2. ONSET: \"When did the diarrhea begin? \"       See above    3. BM CONSISTENCY: \"How loose or watery is the diarrhea? \"       Watery    4. VOMITING: \"Are you also vomiting? \" If so, ask: \"How many times in the past 24 hours? \"       Denies    5. ABDOMINAL PAIN: Tamea Yong you having any abdominal pain? \" If yes: \"What does it feel like? \" (e.g., crampy, dull, intermittent, constant)       Low abdominal pain. 6. ABDOMINAL PAIN SEVERITY: If present, ask: \"How bad is the pain? \"  (e.g., Scale 1-10; mild, moderate, or severe)    - MILD (1-3): doesn't interfere with normal activities, abdomen soft and not tender to touch     - MODERATE (4-7): interferes with normal activities or awakens from sleep, tender to touch     - SEVERE (8-10): excruciating pain, doubled over, unable to do any normal activities        3-4/10    7.  ORAL INTAKE: If vomiting, \"Have you been able to drink liquids? \" \"How much fluids have you had in the past 24 hours? \"      Drinking fluids and tolerating well. 8. HYDRATION: \"Any signs of dehydration? \" (e.g., dry mouth [not just dry lips], too weak to stand, dizziness, new weight loss) \"When did you last urinate? \"      Denies    9. EXPOSURE: \"Have you traveled to a foreign country recently? \" \"Have you been exposed to anyone with diarrhea? \" \"Could you have eaten any food that was spoiled? \"      Denies    10. ANTIBIOTIC USE: \"Are you taking antibiotics now or have you taken antibiotics in the past 2 months? \"        Denies    11. OTHER SYMPTOMS: \"Do you have any other symptoms? \" (e.g., fever, blood in stool)        See above    12. PREGNANCY: \"Is there any chance you are pregnant? \" \"When was your last menstrual period? \"        NA    Protocols used: DIARRHEA-ADULT-[place]    Patient called pre-service center [facility]) to schedule appointment, with red flag complaint, transferred to RN access for triage. See above questions and answers. Caller talking full sentences without any distress on phone. Discussed disposition and patient agreeable. Discussed potential consequences for not following disposition recommendation. Aware to call back with any concerns or persistent, worsening, or new symptoms develop. Attention Provider: Thank you for allowing me to participate in the care of your patient. The  patient was connected to triage in response to information provided to the [facility]. Please do not respond through this encounter as the response is not directed to a shared pool.

## 2020-10-19 NOTE — ED PROVIDER NOTES
[facility] EMERGENCY DEPT  eMERGENCY dEPARTMENT eNCOUnter      Pt Name: Shanda Myers  MRN: [id number]  [place] 1990  Date of evaluation: 10/19/2020  Provider: Pradip Wilson, [address]       Chief Complaint   Patient presents with    Rectal Bleeding     Pt to ED with c/o blood in stool x1 week          HISTORY OF PRESENT ILLNESS   (Location/Symptom, Timing/Onset,Context/Setting, Quality, Duration, Modifying Factors, Severity)  Note limiting factors. Manuel Ashby a 31 y.o. male who presents to the emergency department for evaluation of rectal bleeding. Pt tells me that he has had problems with diarrhea over past weeks associated with bright red blood in stool and with wiping. He denies blood clots. He has had no rectal pain. He tells me that he has had bilateral lower abdominal pain over past couple of days. He tells me that he has had recent travel to [facility] reporting eating seafood there. He denies history of inflammatory bowel problems. He has no history of hemorrhoids. He has had no fevers or vomiting. He denies previous problems with gi bleeding. Pt denies previous abdominal surgery. [facility]    Nursing Notes were reviewed. REVIEW OF SYSTEMS    (2-9 systems for level 4, 10 or more for level 5)     Review of Systems   Constitutional: Negative. Gastrointestinal: Positive for abdominal pain and blood in stool. All other systems reviewed and are negative. A complete review of systems was performed and is negative except as noted above in the HPI. PAST MEDICAL HISTORY   History reviewed. No pertinent past medical history. SURGICAL HISTORY       Past Surgical History:   Procedure Laterality Date    ANTERIOR CRUCIATE LIGAMENT REPAIR Left     WISDOM TOOTH EXTRACTION           CURRENT MEDICATIONS       Previous Medications    CLOBETASOL (TEMOVATE) 0.05 % CREAM    APPLY TOPICALLY 2 TIMES DAILY.     MULTIPLE VITAMINS-MINERALS (THERAPEUTIC MULTIVITAMIN-MINERALS) TABLET    Take 1 tablet by mouth daily    TRAZODONE (DESYREL) 50 MG TABLET    1-2 tablets at bedtime as needed for sleep. VIT B6-VIT B12-OMEGA 3 ACIDS (OMEGA-3 2100+B PO)    Take 3 capsules by mouth daily Omega b stress vitamin       ALLERGIES     Patient has no known allergies.     FAMILY HISTORY       Family History   Problem Relation Age of Onset    Thyroid Disease Mother     Atrial Fibrillation Father     Cancer Other         aunt and uncle colon cancer          SOCIAL HISTORY       Social History     Socioeconomic History    Marital status: Single     Spouse name: None    Number of children: None    Years of education: None    Highest education level: None   Occupational History    None   Social Needs    Financial resource strain: None    Food insecurity     Worry: None     Inability: None    Transportation needs     Medical: None     Non-medical: None   Tobacco Use    Smoking status: Never Smoker    Smokeless tobacco: Current User     Types: Chew   Substance and Sexual Activity    Alcohol use: Yes     Frequency: 2-4 times a month    Drug use: Never    Sexual activity: None   Lifestyle    Physical activity     Days per week: None     Minutes per session: None    Stress: None   Relationships    Social connections     Talks on phone: None     Gets together: None     Attends [employer] service: None     Active member of club or organization: None     Attends meetings of clubs or organizations: None     Relationship status: None    Intimate partner violence     Fear of current or ex partner: None     Emotionally abused: None     Physically abused: None     Forced sexual activity: None   Other Topics Concern    None   Social History Narrative    None       SCREENINGS    [place] Coma Scale  Eye Opening: Spontaneous  Best Verbal Response: Oriented  Best Motor Response: Obeys commands  [place] Coma Scale Score: 15        PHYSICAL EXAM    (up to 7 for level 4, 8 or more for level 5)     ED Triage Vitals [10/19/20 1025]   BP Temp Temp src Pulse Resp SpO2 Height Weight   127/78 98.2 °F (36.8 °C) -- 80 20 97 % 5' 11\" (1.803 m) 185 lb (83.9 kg)       Physical Exam  Vitals signs reviewed. HENT:      Head: Normocephalic. Right Ear: External ear normal.      Left Ear: External ear normal.   Eyes:      Conjunctiva/sclera: Conjunctivae normal.      Pupils: Pupils are equal, round, and reactive to light. Neck:      Musculoskeletal: Normal range of motion. Cardiovascular:      Rate and Rhythm: Normal rate and regular rhythm. Heart sounds: Normal heart sounds. Pulmonary:      Effort: Pulmonary effort is normal.      Breath sounds: Normal breath sounds. Abdominal:      General: Bowel sounds are normal.      Palpations: Abdomen is soft. Tenderness: There is abdominal tenderness (mild ttp bilateral lower quadrants of abdomen). Genitourinary:     Rectum: Normal. Guaiac result positive. Comments: No obvious rectal fissure or external hemorrhoids noted  Scant brown stool on exam glove  Chaperoned exam by Elsa Matson RN  Musculoskeletal: Normal range of motion. Skin:     General: Skin is warm and dry. Neurological:      Mental Status: He is alert and oriented to person, place, and time. DIAGNOSTIC RESULTS     EKG: All EKG's are interpreted by the Emergency Department Physician who either signs or Co-signs this chart in the absence of acardiologist.        RADIOLOGY:   Non-plain film images such as CT, Ultrasound andMRI are read by the radiologist. Plain radiographic images are visualized and preliminarily interpreted by the emergency physician with the below findings:        Interpretation per the Radiologist below, if available at the time of this note:    CT ABDOMEN PELVIS W IV CONTRAST Additional Contrast? None   Final Result   Acute appendicitis. Acute inflammatory process involving the distal descending colon,   sigmoid colon and rectum.    The above results were immediately called to the emergency room and   given to Dr. Cheri Lopez. The above findings are recorded on a digital voice clip in PACS. Signed by Dr Eula Carrera on 10/19/2020 12:08 PM            ED BEDSIDE ULTRASOUND:   Performed by ED Physician - none    LABS:  Labs Reviewed   CBC WITH AUTO DIFFERENTIAL - Abnormal; Notable for the following components:       Result Value    RBC 4.60 (*)     Hemoglobin 13.6 (*)     Hematocrit 41.1 (*)     RDW 14.8 (*)     MPV 9.0 (*)     Neutrophils % 67.0 (*)     Lymphocytes % 14.9 (*)     Monocytes % 12.8 (*)     Monocytes Absolute 1.00 (*)     All other components within normal limits   CULTURE, STOOL   FECAL LEUKOCYTES   C DIFF TOXIN/ANTIGEN   COMPREHENSIVE METABOLIC PANEL W/ REFLEX TO MG FOR LOW K   PROTIME-INR   COVID-19   COVID-19   COVID-19   POCT OCCULT BLOOD STOOL NON CA SCREEN   TYPE AND SCREEN       All other labs were within normal range or not returned as of this dictation. RE-ASSESSMENT     Pt tells me that he last ate food around 7pm last night having last drank water today around 9am.  Discussed CT report with Dr. Sharri Jay who has reviewed CT as well. She will consult asked to admit to medicine with GI consult. Discussed with Dr. Luciano Bocanegra who will admit patient to hospitalist service. EMERGENCY DEPARTMENT COURSE and DIFFERENTIALDIAGNOSIS/MDM:   Vitals:    Vitals:    10/19/20 1025 10/19/20 1232   BP: 127/78 123/77   Pulse: 80 80   Resp: 20 19   Temp: 98.2 °F (36.8 °C)    SpO2: 97% 96%   Weight: 185 lb (83.9 kg)    Height: 5' 11\" (1.803 m)        MDM      CONSULTS:  IP CONSULT TO GENERAL SURGERY  IP CONSULT TO GI    PROCEDURES:  Unless otherwise notedbelow, none     Procedures    FINAL IMPRESSION     1. Colitis    2. Atypical appendicitis    3. Rectal bleed          DISPOSITION/PLAN   DISPOSITION        PATIENT REFERRED TO:  No follow-up provider specified.     DISCHARGE MEDICATIONS:       Current Discharge Medication List          (Pleasenote that portions of this note were completed with a voice recognition program.  Efforts were made to edit the dictations but occasionally words are mis-transcribed.)              Chrystal Alvarez, TAYLOR  10/19/20 1300

## 2020-10-20 LAB
ANION GAP SERPL CALCULATED.3IONS-SCNC: 12 MMOL/L (ref 7–19)
BASOPHILS ABSOLUTE: 0 K/UL (ref 0–0.2)
BASOPHILS ABSOLUTE: 0 K/UL (ref 0–0.2)
BASOPHILS RELATIVE PERCENT: 0.6 % (ref 0–1)
BASOPHILS RELATIVE PERCENT: 0.6 % (ref 0–1)
BUN BLDV-MCNC: 9 MG/DL (ref 6–20)
C-REACTIVE PROTEIN: 4.11 MG/DL (ref 0–0.5)
CALCIUM SERPL-MCNC: 8.8 MG/DL (ref 8.6–10)
CEA: 2.7 NG/ML (ref 0–4.7)
CHLORIDE BLD-SCNC: 106 MMOL/L (ref 98–111)
CO2: 23 MMOL/L (ref 22–29)
CREAT SERPL-MCNC: 1 MG/DL (ref 0.5–1.2)
CRYPTOSPORIDIUM ANTIGEN STOOL: NORMAL
EOSINOPHILS ABSOLUTE: 0.4 K/UL (ref 0–0.6)
EOSINOPHILS ABSOLUTE: 0.4 K/UL (ref 0–0.6)
EOSINOPHILS RELATIVE PERCENT: 5.9 % (ref 0–5)
EOSINOPHILS RELATIVE PERCENT: 7 % (ref 0–5)
GFR AFRICAN AMERICAN: >59
GFR NON-AFRICAN AMERICAN: >60
GIARDIA ANTIGEN STOOL: NORMAL
GLUCOSE BLD-MCNC: 71 MG/DL (ref 74–109)
HAV IGM SER IA-ACNC: NORMAL
HCT VFR BLD CALC: 35.3 % (ref 42–52)
HCT VFR BLD CALC: 40.3 % (ref 42–52)
HEMOGLOBIN: 11.5 G/DL (ref 14–18)
HEMOGLOBIN: 13.2 G/DL (ref 14–18)
HEPATITIS B CORE IGM ANTIBODY: NORMAL
HEPATITIS B SURFACE ANTIGEN INTERPRETATION: NORMAL
HEPATITIS C ANTIBODY INTERPRETATION: NORMAL
IMMATURE GRANULOCYTES #: 0 K/UL
IMMATURE GRANULOCYTES #: 0 K/UL
LYMPHOCYTES ABSOLUTE: 1.5 K/UL (ref 1.1–4.5)
LYMPHOCYTES ABSOLUTE: 1.6 K/UL (ref 1.1–4.5)
LYMPHOCYTES RELATIVE PERCENT: 24.3 % (ref 20–40)
LYMPHOCYTES RELATIVE PERCENT: 24.6 % (ref 20–40)
MCH RBC QN AUTO: 29.1 PG (ref 27–31)
MCH RBC QN AUTO: 29.2 PG (ref 27–31)
MCHC RBC AUTO-ENTMCNC: 32.6 G/DL (ref 33–37)
MCHC RBC AUTO-ENTMCNC: 32.8 G/DL (ref 33–37)
MCV RBC AUTO: 89 FL (ref 80–94)
MCV RBC AUTO: 89.6 FL (ref 80–94)
MONOCYTES ABSOLUTE: 0.9 K/UL (ref 0–0.9)
MONOCYTES ABSOLUTE: 1 K/UL (ref 0–0.9)
MONOCYTES RELATIVE PERCENT: 13.9 % (ref 0–10)
MONOCYTES RELATIVE PERCENT: 14.9 % (ref 0–10)
NEUTROPHILS ABSOLUTE: 3.3 K/UL (ref 1.5–7.5)
NEUTROPHILS ABSOLUTE: 3.6 K/UL (ref 1.5–7.5)
NEUTROPHILS RELATIVE PERCENT: 53.6 % (ref 50–65)
NEUTROPHILS RELATIVE PERCENT: 53.7 % (ref 50–65)
PDW BLD-RTO: 14.5 % (ref 11.5–14.5)
PDW BLD-RTO: 14.8 % (ref 11.5–14.5)
PLATELET # BLD: 160 K/UL (ref 130–400)
PLATELET # BLD: 181 K/UL (ref 130–400)
PMV BLD AUTO: 9.5 FL (ref 9.4–12.4)
PMV BLD AUTO: 9.8 FL (ref 9.4–12.4)
POTASSIUM REFLEX MAGNESIUM: 3.8 MMOL/L (ref 3.5–5)
RBC # BLD: 3.94 M/UL (ref 4.7–6.1)
RBC # BLD: 4.53 M/UL (ref 4.7–6.1)
SEDIMENTATION RATE, ERYTHROCYTE: 29 MM/HR (ref 0–10)
SODIUM BLD-SCNC: 141 MMOL/L (ref 136–145)
WBC # BLD: 6.3 K/UL (ref 4.8–10.8)
WBC # BLD: 6.6 K/UL (ref 4.8–10.8)

## 2020-10-20 PROCEDURE — 87328 CRYPTOSPORIDIUM AG IA: CPT

## 2020-10-20 PROCEDURE — 36415 COLL VENOUS BLD VENIPUNCTURE: CPT

## 2020-10-20 PROCEDURE — 87329 GIARDIA AG IA: CPT

## 2020-10-20 PROCEDURE — 2500000003 HC RX 250 WO HCPCS: Performed by: INTERNAL MEDICINE

## 2020-10-20 PROCEDURE — 80048 BASIC METABOLIC PNL TOTAL CA: CPT

## 2020-10-20 PROCEDURE — 99232 SBSQ HOSP IP/OBS MODERATE 35: CPT | Performed by: SURGERY

## 2020-10-20 PROCEDURE — 6370000000 HC RX 637 (ALT 250 FOR IP): Performed by: STUDENT IN AN ORGANIZED HEALTH CARE EDUCATION/TRAINING PROGRAM

## 2020-10-20 PROCEDURE — 99253 IP/OBS CNSLTJ NEW/EST LOW 45: CPT | Performed by: INTERNAL MEDICINE

## 2020-10-20 PROCEDURE — 86140 C-REACTIVE PROTEIN: CPT

## 2020-10-20 PROCEDURE — 6360000002 HC RX W HCPCS: Performed by: STUDENT IN AN ORGANIZED HEALTH CARE EDUCATION/TRAINING PROGRAM

## 2020-10-20 PROCEDURE — 82378 CARCINOEMBRYONIC ANTIGEN: CPT

## 2020-10-20 PROCEDURE — 1210000000 HC MED SURG R&B

## 2020-10-20 PROCEDURE — 2580000003 HC RX 258: Performed by: STUDENT IN AN ORGANIZED HEALTH CARE EDUCATION/TRAINING PROGRAM

## 2020-10-20 PROCEDURE — 85025 COMPLETE CBC W/AUTO DIFF WBC: CPT

## 2020-10-20 PROCEDURE — 83993 ASSAY FOR CALPROTECTIN FECAL: CPT

## 2020-10-20 PROCEDURE — 6370000000 HC RX 637 (ALT 250 FOR IP): Performed by: INTERNAL MEDICINE

## 2020-10-20 PROCEDURE — 86255 FLUORESCENT ANTIBODY SCREEN: CPT

## 2020-10-20 PROCEDURE — 85652 RBC SED RATE AUTOMATED: CPT

## 2020-10-20 PROCEDURE — 80074 ACUTE HEPATITIS PANEL: CPT

## 2020-10-20 RX ORDER — DICYCLOMINE HCL 20 MG
20 TABLET ORAL 4 TIMES DAILY PRN
Status: DISCONTINUED | OUTPATIENT
Start: 2020-10-20 | End: 2020-10-22 | Stop reason: HOSPADM

## 2020-10-20 RX ORDER — MESALAMINE 4 G/60ML
4 ENEMA RECTAL NIGHTLY
Status: DISCONTINUED | OUTPATIENT
Start: 2020-10-20 | End: 2020-10-22 | Stop reason: HOSPADM

## 2020-10-20 RX ADMIN — METRONIDAZOLE 500 MG: 500 INJECTION, SOLUTION INTRAVENOUS at 19:33

## 2020-10-20 RX ADMIN — PIPERACILLIN AND TAZOBACTAM 4.5 G: 4; .5 INJECTION, POWDER, LYOPHILIZED, FOR SOLUTION INTRAVENOUS at 13:34

## 2020-10-20 RX ADMIN — PIPERACILLIN AND TAZOBACTAM 4.5 G: 4; .5 INJECTION, POWDER, LYOPHILIZED, FOR SOLUTION INTRAVENOUS at 06:10

## 2020-10-20 RX ADMIN — MESALAMINE 4 G: 4 ENEMA RECTAL at 21:39

## 2020-10-20 RX ADMIN — METRONIDAZOLE 500 MG: 500 INJECTION, SOLUTION INTRAVENOUS at 10:24

## 2020-10-20 RX ADMIN — DICYCLOMINE HYDROCHLORIDE 20 MG: 20 TABLET ORAL at 12:03

## 2020-10-20 RX ADMIN — POLYETHYLENE GLYCOL 3350, SODIUM SULFATE ANHYDROUS, SODIUM BICARBONATE, SODIUM CHLORIDE, POTASSIUM CHLORIDE 4000 ML: 236; 22.74; 6.74; 5.86; 2.97 POWDER, FOR SOLUTION ORAL at 11:42

## 2020-10-20 NOTE — PROGRESS NOTES
Daily Progress Note    Date:10/20/2020  Patient: Ashwin Kim  : 1990  XOB:[id number]  CODE:Full Code No additional code details  Francisco Aguilar MD    Admit Date: 10/19/2020 10:30 AM   LOS: 1 day       Subjective:   43-year-old male admitted on 10/19/2020 with loose stools and diarrhea over the past week, with development of blood in stools over 2 days and associated lower abdominal pain-admitted with colitis and appendicitis. Surgery evaluated and felt colitis was main issue and no need for surgical intervention regarding appendicitis. Treated with IV antibiotics, consulted GI.  GI planning for colonoscopy tomorrow on 10/21/2020. Patient doing okay today with no worsening abdominal pain. He continues to have some loose stools with blood noted. No melena. Review of Systems    Comprehensive ROS completed and is negative except as otherwise noted      Objective:      Vital signs in last 24 hours:  Patient Vitals for the past 24 hrs:   BP Temp Temp src Pulse Resp SpO2 Height Weight   10/20/20 0702 90/70 96.8 °F (36 °C) Temporal 67 18 95 % -- --   10/20/20 0324 90/61 98.3 °F (36.8 °C) Temporal 52 18 95 % -- --   10/19/20 2351 (!) 92/52 97.3 °F (36.3 °C) Temporal 59 18 94 % -- --   10/19/20 1831 (!) 101/57 98.2 °F (36.8 °C) Temporal 61 18 94 % -- --   10/19/20 1404 120/73 97.3 °F (36.3 °C) Temporal 69 18 97 % -- --   10/19/20 1352 118/71 98.4 °F (36.9 °C) Oral 68 17 96 % -- --   10/19/20 1301 119/66 -- -- 66 16 95 % -- --   10/19/20 1232 123/77 -- -- 80 19 96 % -- --   10/19/20 1025 127/78 98.2 °F (36.8 °C) -- 80 20 97 % 5' 11\" (1.803 m) 185 lb (83.9 kg)     CONSTITUTIONAL: Appears well developed and  no distress  EYES: DAVION, symmetrical. Conjunctiva are moist, non-icteric. Lids are normal  ENT: Throat: Lips are normal. Oral mucosa is pink and moist.   NECK: Trachea is midline. LUNGS: Normal respiratory effort, CTAB  CARDIOVASCULAR: Regular rate and rhythm, Pulses are equal bilaterally.    GI/ABDOMEN: Soft, mild bilateral lower quadrant tenderness w/o guarding/rebound, BS+  SKIN: Warm and dry.    EXTREMITIES: Normal ROM       Lab Review   Recent Results (from the past 24 hour(s))   CBC Auto Differential    Collection Time: 10/19/20 10:45 AM   Result Value Ref Range    WBC 7.6 4.8 - 10.8 K/uL    RBC 4.60 (L) 4.70 - 6.10 M/uL    Hemoglobin 13.6 (L) 14.0 - 18.0 g/dL    Hematocrit 41.1 (L) 42.0 - 52.0 %    MCV 89.3 80.0 - 94.0 fL    MCH 29.6 27.0 - 31.0 pg    MCHC 33.1 33.0 - 37.0 g/dL    RDW 14.8 (H) 11.5 - 14.5 %    Platelets 630 969 - 108 K/uL    MPV 9.0 (L) 9.4 - 12.4 fL    Neutrophils % 67.0 (H) 50.0 - 65.0 %    Lymphocytes % 14.9 (L) 20.0 - 40.0 %    Monocytes % 12.8 (H) 0.0 - 10.0 %    Eosinophils % 4.2 0.0 - 5.0 %    Basophils % 0.8 0.0 - 1.0 %    Neutrophils Absolute 5.1 1.5 - 7.5 K/uL    Immature Granulocytes # 0.0 K/uL    Lymphocytes Absolute 1.1 1.1 - 4.5 K/uL    Monocytes Absolute 1.00 (H) 0.00 - 0.90 K/uL    Eosinophils Absolute 0.30 0.00 - 0.60 K/uL    Basophils Absolute 0.10 0.00 - 0.20 K/uL   Comprehensive Metabolic Panel w/ Reflex to MG    Collection Time: 10/19/20 10:45 AM   Result Value Ref Range    Sodium 139 136 - 145 mmol/L    Potassium reflex Magnesium 3.9 3.5 - 5.0 mmol/L    Chloride 102 98 - 111 mmol/L    CO2 26 22 - 29 mmol/L    Anion Gap 11 7 - 19 mmol/L    Glucose 90 74 - 109 mg/dL    BUN 10 6 - 20 mg/dL    CREATININE 1.0 0.5 - 1.2 mg/dL    GFR Non-African American >60 >60    GFR African American >59 >59    Calcium 9.8 8.6 - 10.0 mg/dL    Total Protein 7.3 6.6 - 8.7 g/dL    Alb 4.4 3.5 - 5.2 g/dL    Total Bilirubin 0.5 0.2 - 1.2 mg/dL    Alkaline Phosphatase 89 40 - 130 U/L    ALT 38 5 - 41 U/L    AST 29 5 - 40 U/L   Protime-INR    Collection Time: 10/19/20 10:45 AM   Result Value Ref Range    Protime 12.6 12.0 - 14.6 sec    INR 0.95 0.88 - 1.18   TYPE AND SCREEN    Collection Time: 10/19/20 10:45 AM   Result Value Ref Range    ABO/Rh O NEG     Antibody Screen NEG    COVID-19    Collection Time: 10/19/20  1:09 PM   Result Value Ref Range    SARS-CoV-2, NAAT Not Detected Not Detected   Culture, Stool    Collection Time: 10/19/20  5:30 PM    Specimen: Stool   Result Value Ref Range    Culture, Stool       No pathogens isolated to date. Culture in progress.     Campylobacter Antigen       Campylobacter Antigen EIA not detected  Normal Range: Not detected     FECAL LEUKOCYTES    Collection Time: 10/19/20  5:30 PM    Specimen: Stool   Result Value Ref Range    LACTOFERRIN, FECAL POSITIVE (A) Negative   Clostridium Difficile Toxin/Antigen    Collection Time: 10/19/20  5:30 PM    Specimen: Stool   Result Value Ref Range    C.diff Toxin/Antigen       Result: Negative for Toxigenic C. difficile by EIA  Normal Range: Negative     CBC auto differential    Collection Time: 10/20/20  3:45 AM   Result Value Ref Range    WBC 6.3 4.8 - 10.8 K/uL    RBC 3.94 (L) 4.70 - 6.10 M/uL    Hemoglobin 11.5 (L) 14.0 - 18.0 g/dL    Hematocrit 35.3 (L) 42.0 - 52.0 %    MCV 89.6 80.0 - 94.0 fL    MCH 29.2 27.0 - 31.0 pg    MCHC 32.6 (L) 33.0 - 37.0 g/dL    RDW 14.8 (H) 11.5 - 14.5 %    Platelets 184 798 - 101 K/uL    MPV 9.8 9.4 - 12.4 fL    Neutrophils % 53.6 50.0 - 65.0 %    Lymphocytes % 24.6 20.0 - 40.0 %    Monocytes % 13.9 (H) 0.0 - 10.0 %    Eosinophils % 7.0 (H) 0.0 - 5.0 %    Basophils % 0.6 0.0 - 1.0 %    Neutrophils Absolute 3.3 1.5 - 7.5 K/uL    Immature Granulocytes # 0.0 K/uL    Lymphocytes Absolute 1.5 1.1 - 4.5 K/uL    Monocytes Absolute 0.90 0.00 - 0.90 K/uL    Eosinophils Absolute 0.40 0.00 - 0.60 K/uL    Basophils Absolute 0.00 0.00 - 0.20 K/uL   Basic Metabolic Panel w/ Reflex to MG    Collection Time: 10/20/20  3:45 AM   Result Value Ref Range    Sodium 141 136 - 145 mmol/L    Potassium reflex Magnesium 3.8 3.5 - 5.0 mmol/L    Chloride 106 98 - 111 mmol/L    CO2 23 22 - 29 mmol/L    Anion Gap 12 7 - 19 mmol/L    Glucose 71 (L) 74 - 109 mg/dL    BUN 9 6 - 20 mg/dL    CREATININE 1.0 0.5 - 1.2 mg/dL    GFR Non- >60 >60    GFR African American >59 >59    Calcium 8.8 8.6 - 10.0 mg/dL       I/O last 3 completed shifts:   In: 1530 [I.V.:1530]  Out: -   I/O this shift:  In: 237.8 [I.V.:237.8]  Out: -       Current Facility-Administered Medications:     metronidazole (FLAGYL) 500 mg in NaCl 100 mL IVPB premix, 500 mg, Intravenous, Q8H, Dimple Boucher MD    [facility]) enema 4 g, 4 g, Rectal, Nightly, Dimple Boucher MD  Jean-Paul Loser  Tanja Sammysandeep ON 10/21/2020] polyethylene glycol (GoLYTELY) solution 4,000 mL, 4,000 mL, Oral, Once, Dimple Boucher MD    ondansetron [facility]) injection 4 mg, 4 mg, Intravenous, Once, Vidhya Galloway, APRN    piperacillin-tazobactam (ZOSYN) 4.5 g in dextrose 5 % 100 mL IVPB (mini-bag), 4.5 g, Intravenous, Q8H, Tenisha Jeff MD, 4.5 g at 10/20/20 0610    traZODone (DESYREL) tablet 50 mg, 50 mg, Oral, Nightly PRN, Tenisha Jeff MD, 50 mg at 10/19/20 2222    sodium chloride flush 0.9 % injection 10 mL, 10 mL, Intravenous, 2 times per day, Tenisha Jeff MD, 10 mL at 10/19/20 2223    sodium chloride flush 0.9 % injection 10 mL, 10 mL, Intravenous, PRN, Tenisha Jeff MD    acetaminophen (TYLENOL) tablet 650 mg, 650 mg, Oral, Q6H PRN **OR** acetaminophen (TYLENOL) suppository 650 mg, 650 mg, Rectal, Q6H PRN, Tenisha Jeff MD    promethazine (PHENERGAN) tablet 12.5 mg, 12.5 mg, Oral, Q6H PRN **OR** ondansetron (ZOFRAN) injection 4 mg, 4 mg, Intravenous, Q6H PRN, Tenisha Jeff MD    0.9 % sodium chloride infusion, , Intravenous, Continuous, Tenisha Jeff MD, Last Rate: 100 mL/hr at 10/19/20 1438    potassium chloride (KLOR-CON M) extended release tablet 40 mEq, 40 mEq, Oral, PRN **OR** potassium bicarb-citric acid (EFFER-K) effervescent tablet 40 mEq, 40 mEq, Oral, PRN **OR** potassium chloride 10 mEq/100 mL IVPB (Peripheral Line), 10 mEq, Intravenous, PRN, Tenisha Jeff MD    magnesium sulfate 2 g in 50 mL IVPB premix, 2 g, Intravenous, PRN, Tenisha Jeff,

## 2020-10-20 NOTE — PROGRESS NOTES
Subjective:  No acute events or changes. Reports that he has been having a lot of diarrhea and blood over night. Objective:  /70   Pulse 73   Temp 97.2 °F (36.2 °C) (Temporal)   Resp 16   Ht 5' 11\" (1.803 m)   Wt 185 lb (83.9 kg)   SpO2 97%   BMI 25.80 kg/m²   General: NAD, AAO  Abdomen: Soft, ND    CBC:   Lab Results   Component Value Date    WBC 6.3 10/20/2020    RBC 3.94 10/20/2020    HGB 11.5 10/20/2020    HCT 35.3 10/20/2020    MCV 89.6 10/20/2020    MCH 29.2 10/20/2020    MCHC 32.6 10/20/2020    RDW 14.8 10/20/2020     10/20/2020    MPV 9.8 10/20/2020     CMP:    Lab Results   Component Value Date     10/20/2020    K 3.8 10/20/2020     10/20/2020    CO2 23 10/20/2020    BUN 9 10/20/2020    CREATININE 1.0 10/20/2020    GFRAA >59 10/20/2020    LABGLOM >60 10/20/2020    GLUCOSE 71 10/20/2020    PROT 7.3 10/19/2020    LABALBU 4.4 10/19/2020    CALCIUM 8.8 10/20/2020    BILITOT 0.5 10/19/2020    ALKPHOS 89 10/19/2020    AST 29 10/19/2020    ALT 38 10/19/2020     Assessment and plan:  31year old male with colitis and possible appendicitis  1) Continued signs and symptoms related to likely infectious colitis.  Continued with no plans for appendectomy at this time  2) continue cares per medicine and GI

## 2020-10-20 NOTE — CONSULTS
Allergies  Home Medications           Prior to Admission medications    Medication Sig Start Date End Date Taking? Authorizing Provider   clobetasol (TEMOVATE) 0.05 % cream APPLY TOPICALLY 2 TIMES DAILY. 5/19/20   Yes Gemma Miller MD   traZODone (DESYREL) 50 MG tablet 1-2 tablets at bedtime as needed for sleep. 6/4/19   Yes Gemma Miller MD   Multiple Vitamins-Minerals (THERAPEUTIC MULTIVITAMIN-MINERALS) tablet Take 1 tablet by mouth daily       Historical Provider, MD   Vit B6-Vit B12-Omega 3 Acids (OMEGA-3 2100+B PO) Take 3 capsules by mouth daily Omega b stress vitamin       Historical Provider, MD DENISE have reviewed all pertinent history. Prior medical records and laboratory evaluation reviewed. Imaging independently reviewed.     Review of Systems:   As per HPI, otherwise all other ROS performed and found to be negative at this time. Head neck supple without adenopathy no jaundice  No thyromegaly or adenopathy  Lungs clear bilaterally  Abdomen scaphoid nontender no rebound no organomegaly no free fluid  Neurologically intact  Extremity regular  Skin regular     Physical Exam:      Vitals:     10/19/20 1301   BP: 119/66   Pulse: 66   Resp: 16   Temp:     SpO2: 95%      CONSTITUTIONAL: Appears well developed and nourished, in some pain but no distress  PSYCH: Judgement and insight are normal. Mental status alert and oriented to person, place and time. Mood and affect are normal  EYES: DAVION, symmetrical. Conjunctiva are moist, non-icteric. Lids are normal  ENT: Throat: Lips are normal. Oral mucosa is pink and moist.   NECK: Trachea is midline. LYMPHATIC: No anterior cervical or axillary lymphadenopathy     LUNGS: Normal respiratory effort, CTAB  CARDIOVASCULAR: Regular rate and rhythm, Pulses are equal bilaterally. GI/ABDOMEN: Soft, mild bilateral lower quadrant tenderness w/o guarding/rebound, BS+  SKIN: Warm and dry.    NEUROLOGICAL: no focal deficits  EXTREMITIES: Normal ROM     Labs:   Recent 10/19/2020  Examination. CT ABDOMEN PELVIS W IV CONTRAST 10/19/2020 10:31 AM History: Bilateral lower abdominal pain. DLP: 1455 mGycm. The CT scan of the abdomen and pelvis is performed after intravenous contrast enhancement. The images are acquired in axial plane with subsequent reconstruction in coronal and sagittal planes. There is no previous study for comparison. The lung bases included in the study are normal. The liver and spleen are normal. No radiopaque gallstones. Moderately prominent phrygian cap is seen. The common bile duct is nondilated. The pancreas is normal. Pancreatic duct is not dilated. The adrenal glands bilaterally are normal. The pain is located bilaterally are normal. No radiopaque calculi. No hydronephrosis. The visualized ureters bilaterally appear normal. The urinary bladder is incompletely distended with moderate diffuse/symmetrical thickening of the wall. No intrinsic abnormality. The prostate is not enlarged. The stomach and duodenum are normal. The small bowel is nondistended. A retrocecal appendix seen with surrounding peritoneal infiltration predominantly of the distal part of the appendix suggesting acute appendicitis. There is surrounding retroperitoneal fat infiltration. No fluid. No free air there is moderate gas and stool in the colon more pronounced in the proximal colon. There is marked thickening of the wall of the distal descending colon, sigmoid colon and rectum. Surrounding retroperitoneal fat infiltration and nodularity. There is increased vascularity and thickening and enhancement of the wall and mucosa of the colon in this area. No free fluid in the pelvis. Normal abdominal aorta and iliac arteries. There is no evidence of abdominal or pelvic lymphadenopathy. The images reviewed in bone window show no acute bony abnormality.     Acute appendicitis. Acute inflammatory process involving the distal descending colon, sigmoid colon and rectum.  The above results were immediately called to the emergency room and given to Dr. Ifrah Prieto. The above findings are recorded on a digital voice clip in PACS. Signed by Dr Ronaldo Osuna on 10/19/2020 12:08 PM        Assessment/Plan:    1. Rectal bleed off and on with abdominal discomfort history of multiple areas of travel [place] and [place]. 2 no family history of IBD but colorectal cancer 2 uncles  3. History of NSAIDs abuse. 4.  On CT scan the CT scan appendicitis, rectosigmoiditis. 5.  Surgical consultation was obtained surgery does not think no intervention on appendectomy    Plan 1 patient in front of the nurses patient needs IV Flagyl. DC Zosyn possible along with p.o. Xifaxan. 2.  Stool calprotectin stool for ova parasites to rule out infectious pathology. C-reactive protein ESR p-ANCA ASCA to evaluate any IBD. 3.  Discussed with the patient and colonoscopy discussed the morbidities and very rare mortalities and colonoscopies patient agrees with a colonoscopy. 4 GoLYTELY n.p.o. from midnight tomorrow morning enema before colonoscopy.

## 2020-10-21 ENCOUNTER — ANESTHESIA EVENT (OUTPATIENT)
Dept: ENDOSCOPY | Age: 30
DRG: 386 | End: 2020-10-21
Payer: COMMERCIAL

## 2020-10-21 ENCOUNTER — ANESTHESIA (OUTPATIENT)
Dept: ENDOSCOPY | Age: 30
DRG: 386 | End: 2020-10-21
Payer: COMMERCIAL

## 2020-10-21 VITALS
TEMPERATURE: 97 F | SYSTOLIC BLOOD PRESSURE: 92 MMHG | OXYGEN SATURATION: 98 % | RESPIRATION RATE: 15 BRPM | DIASTOLIC BLOOD PRESSURE: 63 MMHG

## 2020-10-21 LAB
ANION GAP SERPL CALCULATED.3IONS-SCNC: 16 MMOL/L (ref 7–19)
BASOPHILS ABSOLUTE: 0 K/UL (ref 0–0.2)
BASOPHILS ABSOLUTE: 0.1 K/UL (ref 0–0.2)
BASOPHILS RELATIVE PERCENT: 0.5 % (ref 0–1)
BASOPHILS RELATIVE PERCENT: 0.8 % (ref 0–1)
BUN BLDV-MCNC: 5 MG/DL (ref 6–20)
CALCIUM SERPL-MCNC: 8.6 MG/DL (ref 8.6–10)
CAMPYLOBACTER ANTIGEN: NORMAL
CHLORIDE BLD-SCNC: 105 MMOL/L (ref 98–111)
CO2: 19 MMOL/L (ref 22–29)
CREAT SERPL-MCNC: 0.9 MG/DL (ref 0.5–1.2)
CULTURE, STOOL: NORMAL
E COLI SHIGA TOXIN ASSAY: NORMAL
EOSINOPHILS ABSOLUTE: 0.1 K/UL (ref 0–0.6)
EOSINOPHILS ABSOLUTE: 0.3 K/UL (ref 0–0.6)
EOSINOPHILS RELATIVE PERCENT: 1.6 % (ref 0–5)
EOSINOPHILS RELATIVE PERCENT: 4.7 % (ref 0–5)
GFR AFRICAN AMERICAN: >59
GFR NON-AFRICAN AMERICAN: >60
GLUCOSE BLD-MCNC: 84 MG/DL (ref 74–109)
HCT VFR BLD CALC: 35.5 % (ref 42–52)
HCT VFR BLD CALC: 37.6 % (ref 42–52)
HEMOGLOBIN: 12 G/DL (ref 14–18)
HEMOGLOBIN: 12.6 G/DL (ref 14–18)
IMMATURE GRANULOCYTES #: 0 K/UL
IMMATURE GRANULOCYTES #: 0 K/UL
LYMPHOCYTES ABSOLUTE: 1.1 K/UL (ref 1.1–4.5)
LYMPHOCYTES ABSOLUTE: 1.9 K/UL (ref 1.1–4.5)
LYMPHOCYTES RELATIVE PERCENT: 18.4 % (ref 20–40)
LYMPHOCYTES RELATIVE PERCENT: 30.6 % (ref 20–40)
MCH RBC QN AUTO: 29.3 PG (ref 27–31)
MCH RBC QN AUTO: 29.4 PG (ref 27–31)
MCHC RBC AUTO-ENTMCNC: 33.5 G/DL (ref 33–37)
MCHC RBC AUTO-ENTMCNC: 33.8 G/DL (ref 33–37)
MCV RBC AUTO: 87 FL (ref 80–94)
MCV RBC AUTO: 87.4 FL (ref 80–94)
MONOCYTES ABSOLUTE: 1 K/UL (ref 0–0.9)
MONOCYTES ABSOLUTE: 1 K/UL (ref 0–0.9)
MONOCYTES RELATIVE PERCENT: 16.2 % (ref 0–10)
MONOCYTES RELATIVE PERCENT: 16.3 % (ref 0–10)
NEUTROPHILS ABSOLUTE: 2.9 K/UL (ref 1.5–7.5)
NEUTROPHILS ABSOLUTE: 3.8 K/UL (ref 1.5–7.5)
NEUTROPHILS RELATIVE PERCENT: 47.4 % (ref 50–65)
NEUTROPHILS RELATIVE PERCENT: 62.9 % (ref 50–65)
PDW BLD-RTO: 14.3 % (ref 11.5–14.5)
PDW BLD-RTO: 14.4 % (ref 11.5–14.5)
PLATELET # BLD: 172 K/UL (ref 130–400)
PLATELET # BLD: 212 K/UL (ref 130–400)
PMV BLD AUTO: 9.4 FL (ref 9.4–12.4)
PMV BLD AUTO: 9.5 FL (ref 9.4–12.4)
POTASSIUM REFLEX MAGNESIUM: 3.9 MMOL/L (ref 3.5–5)
RBC # BLD: 4.08 M/UL (ref 4.7–6.1)
RBC # BLD: 4.3 M/UL (ref 4.7–6.1)
SODIUM BLD-SCNC: 140 MMOL/L (ref 136–145)
WBC # BLD: 6.1 K/UL (ref 4.8–10.8)
WBC # BLD: 6.2 K/UL (ref 4.8–10.8)

## 2020-10-21 PROCEDURE — 2580000003 HC RX 258: Performed by: ANESTHESIOLOGY

## 2020-10-21 PROCEDURE — 3700000000 HC ANESTHESIA ATTENDED CARE: Performed by: INTERNAL MEDICINE

## 2020-10-21 PROCEDURE — 0DBP8ZX EXCISION OF RECTUM, VIA NATURAL OR ARTIFICIAL OPENING ENDOSCOPIC, DIAGNOSTIC: ICD-10-PCS | Performed by: INTERNAL MEDICINE

## 2020-10-21 PROCEDURE — 36415 COLL VENOUS BLD VENIPUNCTURE: CPT

## 2020-10-21 PROCEDURE — 85025 COMPLETE CBC W/AUTO DIFF WBC: CPT

## 2020-10-21 PROCEDURE — 6360000002 HC RX W HCPCS

## 2020-10-21 PROCEDURE — 1210000000 HC MED SURG R&B

## 2020-10-21 PROCEDURE — 45380 COLONOSCOPY AND BIOPSY: CPT | Performed by: INTERNAL MEDICINE

## 2020-10-21 PROCEDURE — 3700000001 HC ADD 15 MINUTES (ANESTHESIA): Performed by: INTERNAL MEDICINE

## 2020-10-21 PROCEDURE — 2580000003 HC RX 258: Performed by: INTERNAL MEDICINE

## 2020-10-21 PROCEDURE — 80048 BASIC METABOLIC PNL TOTAL CA: CPT

## 2020-10-21 PROCEDURE — 6370000000 HC RX 637 (ALT 250 FOR IP): Performed by: INTERNAL MEDICINE

## 2020-10-21 PROCEDURE — 3609010300 HC COLONOSCOPY W/BIOPSY SINGLE/MULTIPLE: Performed by: INTERNAL MEDICINE

## 2020-10-21 PROCEDURE — 2500000003 HC RX 250 WO HCPCS

## 2020-10-21 PROCEDURE — 0DBM8ZX EXCISION OF DESCENDING COLON, VIA NATURAL OR ARTIFICIAL OPENING ENDOSCOPIC, DIAGNOSTIC: ICD-10-PCS | Performed by: INTERNAL MEDICINE

## 2020-10-21 PROCEDURE — 0DBN8ZX EXCISION OF SIGMOID COLON, VIA NATURAL OR ARTIFICIAL OPENING ENDOSCOPIC, DIAGNOSTIC: ICD-10-PCS | Performed by: INTERNAL MEDICINE

## 2020-10-21 PROCEDURE — 2580000003 HC RX 258: Performed by: STUDENT IN AN ORGANIZED HEALTH CARE EDUCATION/TRAINING PROGRAM

## 2020-10-21 PROCEDURE — 2500000003 HC RX 250 WO HCPCS: Performed by: INTERNAL MEDICINE

## 2020-10-21 PROCEDURE — 7100000001 HC PACU RECOVERY - ADDTL 15 MIN: Performed by: INTERNAL MEDICINE

## 2020-10-21 PROCEDURE — 2709999900 HC NON-CHARGEABLE SUPPLY: Performed by: INTERNAL MEDICINE

## 2020-10-21 PROCEDURE — 0DBL8ZX EXCISION OF TRANSVERSE COLON, VIA NATURAL OR ARTIFICIAL OPENING ENDOSCOPIC, DIAGNOSTIC: ICD-10-PCS | Performed by: INTERNAL MEDICINE

## 2020-10-21 PROCEDURE — 7100000000 HC PACU RECOVERY - FIRST 15 MIN: Performed by: INTERNAL MEDICINE

## 2020-10-21 RX ORDER — LORAZEPAM 2 MG/ML
0.5 INJECTION INTRAMUSCULAR ONCE
Status: DISCONTINUED | OUTPATIENT
Start: 2020-10-21 | End: 2020-10-22 | Stop reason: HOSPADM

## 2020-10-21 RX ORDER — PROPOFOL 10 MG/ML
INJECTION, EMULSION INTRAVENOUS PRN
Status: DISCONTINUED | OUTPATIENT
Start: 2020-10-21 | End: 2020-10-21 | Stop reason: SDUPTHER

## 2020-10-21 RX ORDER — SODIUM CHLORIDE, SODIUM LACTATE, POTASSIUM CHLORIDE, CALCIUM CHLORIDE 600; 310; 30; 20 MG/100ML; MG/100ML; MG/100ML; MG/100ML
INJECTION, SOLUTION INTRAVENOUS CONTINUOUS
Status: DISCONTINUED | OUTPATIENT
Start: 2020-10-21 | End: 2020-10-21

## 2020-10-21 RX ORDER — LIDOCAINE HYDROCHLORIDE 10 MG/ML
INJECTION, SOLUTION EPIDURAL; INFILTRATION; INTRACAUDAL; PERINEURAL PRN
Status: DISCONTINUED | OUTPATIENT
Start: 2020-10-21 | End: 2020-10-21 | Stop reason: SDUPTHER

## 2020-10-21 RX ADMIN — PROPOFOL 50 MG: 10 INJECTION, EMULSION INTRAVENOUS at 15:25

## 2020-10-21 RX ADMIN — PROPOFOL 50 MG: 10 INJECTION, EMULSION INTRAVENOUS at 15:27

## 2020-10-21 RX ADMIN — METRONIDAZOLE 500 MG: 500 INJECTION, SOLUTION INTRAVENOUS at 00:34

## 2020-10-21 RX ADMIN — SODIUM CHLORIDE: 9 INJECTION, SOLUTION INTRAVENOUS at 00:34

## 2020-10-21 RX ADMIN — LIDOCAINE HYDROCHLORIDE 100 MG: 10 INJECTION, SOLUTION EPIDURAL; INFILTRATION; INTRACAUDAL; PERINEURAL at 15:24

## 2020-10-21 RX ADMIN — PROPOFOL 50 MG: 10 INJECTION, EMULSION INTRAVENOUS at 15:31

## 2020-10-21 RX ADMIN — MESALAMINE 4 G: 4 ENEMA RECTAL at 21:00

## 2020-10-21 RX ADMIN — PROPOFOL 100 MG: 10 INJECTION, EMULSION INTRAVENOUS at 15:33

## 2020-10-21 RX ADMIN — PROPOFOL 100 MG: 10 INJECTION, EMULSION INTRAVENOUS at 15:28

## 2020-10-21 RX ADMIN — PROPOFOL 50 MG: 10 INJECTION, EMULSION INTRAVENOUS at 15:30

## 2020-10-21 RX ADMIN — METRONIDAZOLE 500 MG: 500 INJECTION, SOLUTION INTRAVENOUS at 09:15

## 2020-10-21 RX ADMIN — SODIUM CHLORIDE, SODIUM LACTATE, POTASSIUM CHLORIDE, AND CALCIUM CHLORIDE: 600; 310; 30; 20 INJECTION, SOLUTION INTRAVENOUS at 14:30

## 2020-10-21 RX ADMIN — PROPOFOL 50 MG: 10 INJECTION, EMULSION INTRAVENOUS at 15:36

## 2020-10-21 RX ADMIN — METRONIDAZOLE 500 MG: 500 INJECTION, SOLUTION INTRAVENOUS at 17:30

## 2020-10-21 RX ADMIN — SODIUM CHLORIDE: 9 INJECTION, SOLUTION INTRAVENOUS at 20:57

## 2020-10-21 ASSESSMENT — PAIN SCALES - GENERAL
PAINLEVEL_OUTOF10: 0

## 2020-10-21 NOTE — ANESTHESIA POSTPROCEDURE EVALUATION
Department of Anesthesiology  Postprocedure Note    Patient: Milena Vasques  MRN: [id number]  YOB: 1990  Date of evaluation: 10/21/2020  Time:  3:47 PM     Procedure Summary     Date:  10/21/20 Room / Location:  [address]    Anesthesia Start:  4642 Anesthesia Stop:  0980    Procedure:  COLONOSCOPY WITH BIOPSY (N/A ) Diagnosis:  (bright red blood per rectum, abd pain)    Surgeon:  Shirin Hill MD Responsible Provider:  TAYLOR Go CRNA    Anesthesia Type:  general ASA Status:  1          Anesthesia Type: general    Gilda Phase I:      Gilda Phase II:      Last vitals: Reviewed and per EMR flowsheets.        Anesthesia Post Evaluation    Patient location during evaluation: bedside  Patient participation: complete - patient participated  Level of consciousness: awake and alert  Pain score: 0  Airway patency: patent  Nausea & Vomiting: no nausea and no vomiting  Complications: no  Cardiovascular status: hemodynamically stable  Respiratory status: acceptable, room air, spontaneous ventilation and nonlabored ventilation

## 2020-10-21 NOTE — ANESTHESIA PRE PROCEDURE
Department of Anesthesiology  Preprocedure Note       Name:  Riya Hess   Age:  31 y.o.  :  1990                                          MRN:  [id number]         Date:  10/21/2020      Surgeon: Lexis Perez):  Ksenia Khan MD    Procedure: Procedure(s):  COLONOSCOPY WITH BIOPSY    Medications prior to admission:   Prior to Admission medications    Medication Sig Start Date End Date Taking? Authorizing Provider   clobetasol (TEMOVATE) 0.05 % cream APPLY TOPICALLY 2 TIMES DAILY. 20  Yes Andra Garrison MD   traZODone (DESYREL) 50 MG tablet 1-2 tablets at bedtime as needed for sleep.  19  Yes Andra Garrison MD   Multiple Vitamins-Minerals (THERAPEUTIC MULTIVITAMIN-MINERALS) tablet Take 1 tablet by mouth daily    Historical Provider, MD       Current medications:    Current Facility-Administered Medications   Medication Dose Route Frequency Provider Last Rate Last Dose    [MAR Hold] LORazepam (ATIVAN) injection 0.5 mg  0.5 mg Intravenous Once Little Bowen MD        lactated ringers infusion   Intravenous Continuous Isi AnoDO jenny 100 mL/hr at 10/21/20 1430      [MAR Hold] metronidazole (FLAGYL) 500 mg in NaCl 100 mL IVPB premix  500 mg Intravenous Q8H Ksenia Khan MD   Stopped at 10/21/20 1102    [MAR Hold] mesalamine (ROWASA) enema 4 g  4 g Rectal Nightly Ksenia Khan MD   4 g at 10/20/20 2139    [MAR Hold] dicyclomine (BENTYL) tablet 20 mg  20 mg Oral 4x Daily PRN Rafat Gonzalez MD   20 mg at 10/20/20 1203    [MAR Hold] ondansetron (ZOFRAN) injection 4 mg  4 mg Intravenous Once Rin Men, APRN        [MAR Hold] traZODone (DESYREL) tablet 50 mg  50 mg Oral Nightly PRN Rafat Gonzalez MD   50 mg at 10/19/20 2222    [MAR Hold] sodium chloride flush 0.9 % injection 10 mL  10 mL Intravenous 2 times per day Rafat Gonzalez MD   10 mL at 10/19/20 2223    [MAR Hold] sodium chloride flush 0.9 % injection 10 mL  10 mL Intravenous PRN Rafat Gonzalez MD        [facility] Hold] acetaminophen (TYLENOL) tablet 650 mg  650 mg Oral Q6H PRN Jose Renee MD        Or   [facility] Hold] acetaminophen (TYLENOL) suppository 650 mg  650 mg Rectal Q6H PRN Jose Renee MD        [facility] Hold] promethazine (PHENERGAN) tablet 12.5 mg  12.5 mg Oral Q6H PRN Jose Renee MD        Or   [facility] Hold] ondansetron [facility]) injection 4 mg  4 mg Intravenous Q6H PRN Jose Renee MD        [facility] Hold] 0.9 % sodium chloride infusion   Intravenous Continuous Jose Renee  mL/hr at 10/21/20 0034      [MAR Hold] potassium chloride (KLOR-CON M) extended release tablet 40 mEq  40 mEq Oral PRN Jose Renee MD        Or   [facility] Hold] potassium bicarb-citric acid (EFFER-K) effervescent tablet 40 mEq  40 mEq Oral PRN Jose Renee MD        Or   [facility] Hold] potassium chloride 10 mEq/100 mL IVPB (Peripheral Line)  10 mEq Intravenous PRN Jose Renee MD       [facility] Hold] magnesium sulfate 2 g in 50 mL IVPB premix  2 g Intravenous PRN Jose Renee MD           Allergies:  No Known Allergies    Problem List:    Patient Active Problem List   Diagnosis Code    [facility] F11.11    Disturbance in sleep behavior G47.9    Psoriasis L40.9    Colitis K52.9    Acute appendicitis K35.80    Rectal bleeding K62.5       Past Medical History:  History reviewed. No pertinent past medical history.     Past Surgical History:        Procedure Laterality Date    ANTERIOR CRUCIATE LIGAMENT REPAIR Left     CYST REMOVAL      ON LP    WISDOM TOOTH EXTRACTION         Social History:    Social History     Tobacco Use    Smoking status: Never Smoker    Smokeless tobacco: Current User     Types: Chew   Substance Use Topics    Alcohol use: Yes     Frequency: 2-4 times a month                                Ready to quit: Not Answered  Counseling given: Not Answered      Vital Signs (Current):   Vitals:    10/21/20 0007 10/21/20 0329 10/21/20 0704 10/21/20 1408   BP: 111/62 (!) 98/52 112/64 (!) 122/55   Pulse: 75 73 62 61   Resp: 16 16 16 18   Temp: 99 °F (37.2 °C) 97 °F (36.1 °C) 97.7 °F (36.5 °C) 97.8 °F (36.6 °C)   TempSrc: Temporal Temporal Temporal Temporal   SpO2: 98% 95% 98% 96%   Weight:       Height:                                                  BP Readings from Last 3 Encounters:   10/21/20 (!) 122/55   06/04/19 120/84       NPO Status:                                                                                 BMI:   Wt Readings from Last 3 Encounters:   10/19/20 185 lb (83.9 kg)   06/04/19 187 lb (84.8 kg)     Body mass index is 25.8 kg/m². CBC:   Lab Results   Component Value Date    WBC 6.1 10/21/2020    RBC 4.08 10/21/2020    HGB 12.0 10/21/2020    HCT 35.5 10/21/2020    MCV 87.0 10/21/2020    RDW 14.3 10/21/2020     10/21/2020       CMP:   Lab Results   Component Value Date     10/21/2020    K 3.9 10/21/2020     10/21/2020    CO2 19 10/21/2020    BUN 5 10/21/2020    CREATININE 0.9 10/21/2020    GFRAA >59 10/21/2020    LABGLOM >60 10/21/2020    GLUCOSE 84 10/21/2020    PROT 7.3 10/19/2020    CALCIUM 8.6 10/21/2020    BILITOT 0.5 10/19/2020    ALKPHOS 89 10/19/2020    AST 29 10/19/2020    ALT 38 10/19/2020       POC Tests: No results for input(s): POCGLU, POCNA, POCK, POCCL, POCBUN, POCHEMO, POCHCT in the last 72 hours.     Coags:   Lab Results   Component Value Date    PROTIME 12.6 10/19/2020    INR 0.95 10/19/2020       HCG (If Applicable): No results found for: PREGTESTUR, PREGSERUM, HCG, HCGQUANT     ABGs: No results found for: PHART, PO2ART, TNF9SBH, RRB0KTG, BEART, [id number]     Type & Screen (If Applicable):  No results found for: LABABO, LABRH    Drug/Infectious Status (If Applicable):  No results found for: HIV, HEPCAB    COVID-19 Screening (If Applicable):   Lab Results   Component Value Date    COVID19 Not Detected 10/19/2020         Anesthesia Evaluation  Patient summary reviewed and Nursing notes reviewed  Airway: Mallampati: II  TM distance: >3 FB     Mouth opening: > = 3

## 2020-10-21 NOTE — OP NOTE
Endoscopic Procedure Note    Patient: Shanda Myers : 1990  Med Rec#: [id number] Acc#: [id number]     Primary Care Provider Osman Fernandez MD  Referring Provider; Nelia Ledesma MD     Endoscopist: Filemon Morris MD    Date of Procedure:  10/21/2020      Procedure:   1. Colonoscopy with serial biopsies from infectious to rectum    Indications:   1. Bleed on CT edematous mucosa with ulcers proctocolitis  2. Rule out infectious colitis    Anesthesia:  MAC    Estimated Blood Loss: minimal    Procedure: Prep score; [place] prep score 5 right-sided: Liquid stool left-sided: Visualized. [place] fluid score 60 cc to 80 cc   Scope was placed through the anal canal and on: Area inflamed red no external hemorrhoid , colonoscopy was placed in cecum ileocecal valve. School cecum was not fully visualized. Ascending colon normal transverse colon normal.  Splenic flexure mild edema loss of health station descending colon superficial ulcer granular mucosa house station not visualized edema no bleeding. Erythema, similar pathology continue till rectum, serial biopsies taken from splenic flexure to descending sigmoid rectosigmoid and rectal area. Grade 1 hemorrhoids    IMPRESSION:  1. Left-sided ulcerative colitis Browne score 7  2. Proctocolitis Browne score 7     RECOMMENDATIONS:    1. Rowasa enema at night. 2.  Mesalamine suppository 1 g at night. 3.  Entocort EC 9 mg a day for a month 6 mg for a month 3 mg for another month tapering dose. 4.  Labs CRP ESR P ANCA ASCA tissue transglutaminase antibody to rule out celiac disease. Patient has psoriasis at the same time. 5.  Follow biopsy report within 6 weeks follow-up with gastroenterologist with biopsy. Follow-up colonoscopy to see Browne score to evaluate the mucosa mucosal healing 1 year. The results were discussed with the patient and family. A copy of the images obtained were given to the patient.      Filemon Morris MD  10/21/2020  3:43 PM

## 2020-10-21 NOTE — PROGRESS NOTES
Daily Progress Note    Date:10/21/2020  Patient: Reid Meckel  : 1990  GOR:[id number]  Ashwin France MD    Admit Date: 10/19/2020 10:30 AM   LOS: 2 days       Subjective:   Completed colonoscopy prep overnight. Says some bright red blood mixed in up until enema this AM. Some LLQ abd pain when he bears down. No n/v or other complaints. Hospital course:  69-year-old male admitted on 10/19/2020 with loose stools and diarrhea over the past week, with development of blood in stools over 2 days and associated lower abdominal pain-admitted with colitis and appendicitis. Surgery evaluated and felt colitis was main issue and no need for surgical intervention regarding appendicitis. Treated with IV antibiotics, consulted GI.  GI planning for colonoscopy today. Review of Systems    Comprehensive ROS completed and is negative except as otherwise noted      Objective:      Vital signs in last 24 hours:  Patient Vitals for the past 24 hrs:   BP Temp Temp src Pulse Resp SpO2   10/21/20 0704 112/64 97.7 °F (36.5 °C) Temporal 62 16 98 %   10/21/20 0329 (!) 98/52 97 °F (36.1 °C) Temporal 73 16 95 %   10/21/20 0007 111/62 99 °F (37.2 °C) Temporal 75 16 98 %   10/20/20 1901 110/66 98.1 °F (36.7 °C) Temporal 68 15 99 %   10/20/20 1507 101/63 97.6 °F (36.4 °C) Temporal 63 16 98 %   10/20/20 1024 114/70 97.2 °F (36.2 °C) Temporal 73 16 97 %     CONSTITUTIONAL: Appears well developed and  no distress  EYES: DAVION, symmetrical. Conjunctiva are moist, non-icteric. Lids are normal  ENT: Throat: Lips are normal. Oral mucosa is pink and moist.   NECK: Trachea is midline. LUNGS: Normal respiratory effort, CTAB  CARDIOVASCULAR: Regular rate and rhythm, Pulses are equal bilaterally. GI/ABDOMEN: Soft, mild left lower quadrant tenderness w/o guarding/rebound, BS+  SKIN: Warm and dry.    EXTREMITIES: Normal ROM       Lab Review   Recent Results (from the past 24 hour(s))   Sedimentation Rate    Collection Time: 10/20/20  9:23 AM   Result Value Ref Range    Sed Rate 29 (H) 0 - 10 mm/Hr   C-REACTIVE PROTEIN    Collection Time: 10/20/20  9:23 AM   Result Value Ref Range    CRP 4.11 (H) 0.00 - 0.50 mg/dL   Hepatitis Panel, Acute    Collection Time: 10/20/20  9:23 AM   Result Value Ref Range    Hep A IgM Non-Reactive Non-reactive    Hep B Core Ab, IgM Non-Reactive Non-reactive    Hep B S Ag Interp Non-Reactive Non-reactive    Hep C Ab Interp Non-Reactive    CEA    Collection Time: 10/20/20  9:23 AM   Result Value Ref Range    CEA 2.7 0.0 - 4.7 ng/mL   O&P SCREEN(GIARDIA/CRYPTOSPORIDIUM) #1    Collection Time: 10/20/20  3:05 PM    Specimen: Stool   Result Value Ref Range    Cryptosporidium Ag Negative  Normal Range: Negative       Giardia Ag, Stl Negative  Normal Range: Negative      CBC auto differential    Collection Time: 10/20/20  5:55 PM   Result Value Ref Range    WBC 6.6 4.8 - 10.8 K/uL    RBC 4.53 (L) 4.70 - 6.10 M/uL    Hemoglobin 13.2 (L) 14.0 - 18.0 g/dL    Hematocrit 40.3 (L) 42.0 - 52.0 %    MCV 89.0 80.0 - 94.0 fL    MCH 29.1 27.0 - 31.0 pg    MCHC 32.8 (L) 33.0 - 37.0 g/dL    RDW 14.5 11.5 - 14.5 %    Platelets 563 257 - 570 K/uL    MPV 9.5 9.4 - 12.4 fL    Neutrophils % 53.7 50.0 - 65.0 %    Lymphocytes % 24.3 20.0 - 40.0 %    Monocytes % 14.9 (H) 0.0 - 10.0 %    Eosinophils % 5.9 (H) 0.0 - 5.0 %    Basophils % 0.6 0.0 - 1.0 %    Neutrophils Absolute 3.6 1.5 - 7.5 K/uL    Immature Granulocytes # 0.0 K/uL    Lymphocytes Absolute 1.6 1.1 - 4.5 K/uL    Monocytes Absolute 1.00 (H) 0.00 - 0.90 K/uL    Eosinophils Absolute 0.40 0.00 - 0.60 K/uL    Basophils Absolute 0.00 0.00 - 0.20 K/uL   Basic Metabolic Panel w/ Reflex to MG    Collection Time: 10/21/20  2:42 AM   Result Value Ref Range    Sodium 140 136 - 145 mmol/L    Potassium reflex Magnesium 3.9 3.5 - 5.0 mmol/L    Chloride 105 98 - 111 mmol/L    CO2 19 (L) 22 - 29 mmol/L    Anion Gap 16 7 - 19 mmol/L    Glucose 84 74 - 109 mg/dL    BUN 5 (L) 6 - 20 mg/dL acetaminophen (TYLENOL) suppository 650 mg, 650 mg, Rectal, Q6H PRN, Tigist Whiting MD    promethazine (PHENERGAN) tablet 12.5 mg, 12.5 mg, Oral, Q6H PRN **OR** ondansetron (ZOFRAN) injection 4 mg, 4 mg, Intravenous, Q6H PRN, Tigist Whiting MD    0.9 % sodium chloride infusion, , Intravenous, Continuous, Tigist Whiting MD, Last Rate: 100 mL/hr at 10/21/20 0034    potassium chloride (KLOR-CON M) extended release tablet 40 mEq, 40 mEq, Oral, PRN **OR** potassium bicarb-citric acid (EFFER-K) effervescent tablet 40 mEq, 40 mEq, Oral, PRN **OR** potassium chloride 10 mEq/100 mL IVPB (Peripheral Line), 10 mEq, Intravenous, PRN, Tigist Whiting MD    magnesium sulfate 2 g in 50 mL IVPB premix, 2 g, Intravenous, PRN, Tigist Whiting MD    Assessment/Plan:     Principal Problem:    Colitis  Active Problems:    Acute appendicitis    Rectal bleeding  Resolved Problems:    * No resolved hospital problems. *         Colitis, involving descending, sigmoid colon and rectum  --Initially on IV Zosyn, however GI has recommended discontinuing Zosyn and starting Flagyl   -- stool studies with negative O&P, cdiff. Stool culture with No Salmonella, Shigella, or E. coli 0157 isolated.  Stool carolyne protection pending.  - ESR, CRP elevated   - GI planning colonoscopy today.      Appendicitis  - General Surgery on board, no surgical intervention     rectal bleeding with stool burden  --GI following, see above  --monitor H/H     Bowel prep, n.p.o. after midnight for colonoscopy tomorrow       DVT prophylaxis-  AC held due to bleed          Richi Camacho MD 10/21/2020 7:51 AM

## 2020-10-22 VITALS
WEIGHT: 185 LBS | SYSTOLIC BLOOD PRESSURE: 111 MMHG | BODY MASS INDEX: 25.9 KG/M2 | HEART RATE: 73 BPM | TEMPERATURE: 97.2 F | DIASTOLIC BLOOD PRESSURE: 67 MMHG | RESPIRATION RATE: 16 BRPM | OXYGEN SATURATION: 95 % | HEIGHT: 71 IN

## 2020-10-22 PROBLEM — K62.5 RECTAL BLEED: Status: RESOLVED | Noted: 2020-10-19 | Resolved: 2020-10-22

## 2020-10-22 PROBLEM — K35.80 ACUTE APPENDICITIS: Status: RESOLVED | Noted: 2020-10-19 | Resolved: 2020-10-22

## 2020-10-22 PROBLEM — K51.90 ULCERATIVE COLITIS (HCC): Status: ACTIVE | Noted: 2020-10-19

## 2020-10-22 PROBLEM — D62 ACUTE BLOOD LOSS ANEMIA: Status: ACTIVE | Noted: 2020-10-22

## 2020-10-22 PROBLEM — D62 ACUTE BLOOD LOSS ANEMIA: Status: RESOLVED | Noted: 2020-10-22 | Resolved: 2020-10-22

## 2020-10-22 LAB
ANCA IFA: NORMAL
ANION GAP SERPL CALCULATED.3IONS-SCNC: 11 MMOL/L (ref 7–19)
BASOPHILS ABSOLUTE: 0 K/UL (ref 0–0.2)
BASOPHILS RELATIVE PERCENT: 0.7 % (ref 0–1)
BUN BLDV-MCNC: 5 MG/DL (ref 6–20)
CALCIUM SERPL-MCNC: 8.4 MG/DL (ref 8.6–10)
CHLORIDE BLD-SCNC: 108 MMOL/L (ref 98–111)
CO2: 23 MMOL/L (ref 22–29)
CREAT SERPL-MCNC: 0.9 MG/DL (ref 0.5–1.2)
EOSINOPHILS ABSOLUTE: 0.3 K/UL (ref 0–0.6)
EOSINOPHILS RELATIVE PERCENT: 5.4 % (ref 0–5)
GFR AFRICAN AMERICAN: >59
GFR NON-AFRICAN AMERICAN: >60
GLUCOSE BLD-MCNC: 90 MG/DL (ref 74–109)
HCT VFR BLD CALC: 35.1 % (ref 42–52)
HEMOGLOBIN: 11.8 G/DL (ref 14–18)
IMMATURE GRANULOCYTES #: 0 K/UL
LYMPHOCYTES ABSOLUTE: 1.6 K/UL (ref 1.1–4.5)
LYMPHOCYTES RELATIVE PERCENT: 28.3 % (ref 20–40)
MCH RBC QN AUTO: 30.2 PG (ref 27–31)
MCHC RBC AUTO-ENTMCNC: 33.6 G/DL (ref 33–37)
MCV RBC AUTO: 89.8 FL (ref 80–94)
MONOCYTES ABSOLUTE: 0.9 K/UL (ref 0–0.9)
MONOCYTES RELATIVE PERCENT: 16.7 % (ref 0–10)
NEUTROPHILS ABSOLUTE: 2.7 K/UL (ref 1.5–7.5)
NEUTROPHILS RELATIVE PERCENT: 48.4 % (ref 50–65)
PDW BLD-RTO: 14.3 % (ref 11.5–14.5)
PLATELET # BLD: 192 K/UL (ref 130–400)
PMV BLD AUTO: 9.5 FL (ref 9.4–12.4)
POTASSIUM REFLEX MAGNESIUM: 3.9 MMOL/L (ref 3.5–5)
RBC # BLD: 3.91 M/UL (ref 4.7–6.1)
SODIUM BLD-SCNC: 142 MMOL/L (ref 136–145)
WBC # BLD: 5.5 K/UL (ref 4.8–10.8)

## 2020-10-22 PROCEDURE — 80048 BASIC METABOLIC PNL TOTAL CA: CPT

## 2020-10-22 PROCEDURE — 2500000003 HC RX 250 WO HCPCS: Performed by: INTERNAL MEDICINE

## 2020-10-22 PROCEDURE — 85025 COMPLETE CBC W/AUTO DIFF WBC: CPT

## 2020-10-22 PROCEDURE — 36415 COLL VENOUS BLD VENIPUNCTURE: CPT

## 2020-10-22 RX ORDER — MESALAMINE 4 G/60ML
4 ENEMA RECTAL NIGHTLY
Qty: 30 ENEMA | Refills: 3 | Status: SHIPPED | OUTPATIENT
Start: 2020-10-22 | End: 2022-03-24

## 2020-10-22 RX ORDER — FERROUS SULFATE 325(65) MG
325 TABLET ORAL
Qty: 30 TABLET | Refills: 5 | Status: SHIPPED | OUTPATIENT
Start: 2020-10-22 | End: 2022-03-24

## 2020-10-22 RX ORDER — MESALAMINE 1000 MG/1
1000 SUPPOSITORY RECTAL NIGHTLY
Qty: 30 SUPPOSITORY | Refills: 0 | Status: SHIPPED | OUTPATIENT
Start: 2020-10-22 | End: 2022-03-24

## 2020-10-22 RX ORDER — BUDESONIDE 3 MG/1
CAPSULE, COATED PELLETS ORAL
Qty: 180 CAPSULE | Refills: 3 | Status: SHIPPED | OUTPATIENT
Start: 2020-10-22 | End: 2022-03-24

## 2020-10-22 RX ADMIN — METRONIDAZOLE 500 MG: 500 INJECTION, SOLUTION INTRAVENOUS at 09:09

## 2020-10-22 RX ADMIN — METRONIDAZOLE 500 MG: 500 INJECTION, SOLUTION INTRAVENOUS at 00:44

## 2020-10-22 ASSESSMENT — PAIN SCALES - GENERAL: PAINLEVEL_OUTOF10: 0

## 2020-10-22 NOTE — DISCHARGE SUMMARY
Discharge Summary    NAME: Tawanna Segura  :  1990  MRN:  [id number]    Admit date:  10/19/2020  Discharge date:  10/22/2020    Admitting Physician:  Tigist Whiting MD    Advance Directive: Full Code    Consults: IP CONSULT TO GENERAL SURGERY  IP CONSULT TO GI    Primary Care Physician:  Redd Balbuena MD    Discharge Diagnoses:  Principal Problem:    Ulcerative colitis ([address]  Resolved Problems:    Acute appendicitis    Rectal bleed    Acute blood loss anemia      HPI:  Patient is a 31 y.o. male with previous history of infectious diarrhea with E. coli over 10 years ago when he was in college, thought related to drinking some well water. He tells me this episode of infectious diarrhea resolved fairly quickly. during that time he did not have any significant GI hemorrhage or kidney dysfunction per report.     Patient presented to [facility] ED due to one week of rectal bleeding with bright red blood per rectum, with associated loose stools and diarrhea, however he notes that this significantly worsened over the past 2 days with more bloody diarrhea and abdominal pain worse with bowel movements. Recently traveled to [facility] about 3 weeks ago and had seafood prior to development of symptoms. He also traveled to [place] in [address] [place] [address] more recently just over a week ago and had seafood there. he denies any nausea, vomiting or hematemesis. No melanotic stools. Uses dip tobacco does not smoke tobacco.  He did take about 4 naproxen tablets yesterday due to pain, but otherwise denies taking any other NSAIDs more frequently over the past 1-2 weeks. He drinks alcohol occasionally in social setting but no recent binge drinking. He denies any recent fevers/chills, sweats, unintentional weight loss, fatigue, shortness of breath, chest pain, headaches, vision changes, dysuria or hematuria. He has no history of inflammatory bowel disease or hemorrhoids. No previous history of GI bleed.   No previous abdominal surgeries. He has family history of 2 uncles that had colon cancer.     In the ED, patient hemodynamically stable with normal heart rate and afebrile. Noted brown stool, but Hemoccult positive. H&H stable, normal WBC and platelet count. Serum chemistry fairly unremarkable. Covid screen pending. INR normal.  CT abdomen/pelvis obtained showed concern for acute appendicitis as well as significant colitis in distal descending, sigmoid colon and rectum. General surgery was contacted initially by ED physician who reviewed imaging and will evaluate patient. General surgery notes more concern regarding colitis, and no urgent surgical intervention needed regarding appendicitis at this time but will follow. GI consulted given rectal bleeding. Hospital Course:  79-year-old male admitted on 10/19/2020 with loose stools and diarrhea over the past week, with development of blood in stools over 2 days and associated lower abdominal pain-admitted with colitis and appendicitis. Surgery evaluated and felt colitis was main issue and no need for surgical intervention regarding appendicitis. Treated with IV antibiotics, consulted GI.  ESR, CRP elevated. stool studies with negative O&P, cdiff. Stool culture with No Salmonella, Shigella, or E. coli 0157 isolated. GI took for colonoscopy, c/w UC with recs as listed below. Will discharge on below medications along with iron supplement and GI f/u. Colonoscopy 10/21/20: IMPRESSION:  1. Left-sided ulcerative colitis Browne score 7  2. Proctocolitis Browne score 7     RECOMMENDATIONS:    1. Rowasa enema at night. 2.  Mesalamine suppository 1 g at night. 3.  Entocort EC 9 mg a day for a month 6 mg for a month 3 mg for another month tapering dose. 4.  Labs CRP ESR P ANCA ASCA tissue transglutaminase antibody to rule out celiac disease. Patient has psoriasis at the same time. 5.  Follow biopsy report within 6 weeks follow-up with gastroenterologist with biopsy.   Follow-up colonoscopy to see Browne score to evaluate the mucosa mucosal healing 1 year. Significant Diagnostic Studies:   Ct Abdomen Pelvis W Iv Contrast Additional Contrast? None    Result Date: 10/19/2020  Examination. CT ABDOMEN PELVIS W IV CONTRAST 10/19/2020 10:31 AM History: Bilateral lower abdominal pain. DLP: 1455 mGycm. The CT scan of the abdomen and pelvis is performed after intravenous contrast enhancement. The images are acquired in axial plane with subsequent reconstruction in coronal and sagittal planes. There is no previous study for comparison. The lung bases included in the study are normal. The liver and spleen are normal. No radiopaque gallstones. Moderately prominent phrygian cap is seen. The common bile duct is nondilated. The pancreas is normal. Pancreatic duct is not dilated. The adrenal glands bilaterally are normal. The pain is located bilaterally are normal. No radiopaque calculi. No hydronephrosis. The visualized ureters bilaterally appear normal. The urinary bladder is incompletely distended with moderate diffuse/symmetrical thickening of the wall. No intrinsic abnormality. The prostate is not enlarged. The stomach and duodenum are normal. The small bowel is nondistended. A retrocecal appendix seen with surrounding peritoneal infiltration predominantly of the distal part of the appendix suggesting acute appendicitis. There is surrounding retroperitoneal fat infiltration. No fluid. No free air there is moderate gas and stool in the colon more pronounced in the proximal colon. There is marked thickening of the wall of the distal descending colon, sigmoid colon and rectum. Surrounding retroperitoneal fat infiltration and nodularity. There is increased vascularity and thickening and enhancement of the wall and mucosa of the colon in this area. No free fluid in the pelvis. Normal abdominal aorta and iliac arteries. There is no evidence of abdominal or pelvic lymphadenopathy.  The images reviewed in mesalamine (CANASA) 1000 MG suppository  Place 1 suppository rectally nightly             mesalamine (ROWASA) 4 g enema  Place 60 mLs rectally nightly             Multiple Vitamins-Minerals (THERAPEUTIC MULTIVITAMIN-MINERALS) tablet  Take 1 tablet by mouth daily             traZODone (DESYREL) 50 MG tablet  1-2 tablets at bedtime as needed for sleep. Hospital Follow-up:  - GI follow-up: Follow biopsy report within 6 weeks follow-up with gastroenterologist with biopsy. Follow-up colonoscopy to see Browne score to evaluate the mucosa mucosal healing 1 year. Diet: DIET GENERAL; Activity: Resume as tolerated     Disposition: Patient is medically stable and will be discharged home. Time spent on discharge > 35 minutes. Signed:   Judy Louie MD

## 2020-10-24 LAB — CALPROTECTIN, FECAL: 1040 UG/G

## 2020-10-26 ENCOUNTER — OFFICE VISIT (OUTPATIENT)
Dept: FAMILY MEDICINE CLINIC | Age: 30
End: 2020-10-26
Payer: COMMERCIAL

## 2020-10-26 VITALS
SYSTOLIC BLOOD PRESSURE: 108 MMHG | DIASTOLIC BLOOD PRESSURE: 70 MMHG | RESPIRATION RATE: 20 BRPM | BODY MASS INDEX: 28 KG/M2 | TEMPERATURE: 97 F | OXYGEN SATURATION: 98 % | HEART RATE: 82 BPM | WEIGHT: 200 LBS | HEIGHT: 71 IN

## 2020-10-26 PROCEDURE — 99496 TRANSJ CARE MGMT HIGH F2F 7D: CPT | Performed by: FAMILY MEDICINE

## 2020-10-26 PROCEDURE — 1111F DSCHRG MED/CURRENT MED MERGE: CPT | Performed by: FAMILY MEDICINE

## 2020-10-26 ASSESSMENT — ENCOUNTER SYMPTOMS
VOMITING: 0
BACK PAIN: 0
NAUSEA: 0
RHINORRHEA: 0
BLOOD IN STOOL: 1
SORE THROAT: 0
DIARRHEA: 1
CONSTIPATION: 0
COUGH: 0
SHORTNESS OF BREATH: 0
ABDOMINAL PAIN: 1
EYE DISCHARGE: 0
EYE PAIN: 0
WHEEZING: 0

## 2020-10-26 ASSESSMENT — PATIENT HEALTH QUESTIONNAIRE - PHQ9
2. FEELING DOWN, DEPRESSED OR HOPELESS: 0
SUM OF ALL RESPONSES TO PHQ QUESTIONS 1-9: 0
1. LITTLE INTEREST OR PLEASURE IN DOING THINGS: 0
SUM OF ALL RESPONSES TO PHQ9 QUESTIONS 1 & 2: 0
SUM OF ALL RESPONSES TO PHQ QUESTIONS 1-9: 0
SUM OF ALL RESPONSES TO PHQ QUESTIONS 1-9: 0

## 2020-10-26 NOTE — PROGRESS NOTES
Post-Discharge Transitional Care Management Services or Hospital Follow Up      Reid Meckel   YOB: 1990    Date of Office Visit:  10/26/2020  Date of Hospital Admission: 10/19/20  Date of Hospital Discharge: 10/22/20  Readmission Risk Score(high >=14%. Medium >=10%):Readmission Risk Score: 10      Care management risk score Rising risk (score 2-5) and Complex Care (Scores >=6): 0     Non face to face  following discharge, date last encounter closed (first attempt may have been earlier): *No documented post hospital discharge outreach found in the last 14 days *No documented post hospital discharge outreach found in the last 14 days    Call initiated 2 business days of discharge: *No response recorded in the last 14 days     Patient Active Problem List   Diagnosis    H[facility]O opioid abuse ([facility]    Disturbance in sleep behavior    Psoriasis    Ulcerative colitis ([facility]       No Known Allergies    Medications listed as ordered at the time of discharge from hospital   Eugune Aase   [facility] Medication Instructions SHELLY:    Printed on:10/26/20 2459   Medication Information                      budesonide (ENTOCORT EC) 3 MG extended release capsule  Take 9mg (three tablets) daily for one month, then 6mg (two tablets) daily for one month, then 3mg (one tablet daily) for one month             clobetasol (TEMOVATE) 0.05 % cream  APPLY TOPICALLY 2 TIMES DAILY. ferrous sulfate (IRON 325) 325 (65 Fe) MG tablet  Take 1 tablet by mouth daily (with breakfast)             mesalamine (CANASA) 1000 MG suppository  Place 1 suppository rectally nightly             mesalamine (ROWASA) 4 g enema  Place 60 mLs rectally nightly             Multiple Vitamins-Minerals (THERAPEUTIC MULTIVITAMIN-MINERALS) tablet  Take 1 tablet by mouth daily             traZODone (DESYREL) 50 MG tablet  1-2 tablets at bedtime as needed for sleep.                    Medications marked \"taking\" at this time  No outpatient medications have been marked as taking for the 10/26/20 encounter (Office Visit) with Zari Matthews MD.        Medications patient taking as of now reconciled against medications ordered at time of hospital discharge: Yes    Chief Complaint   Patient presents with   [address] from [address] [facility] thursday    GI Bleeding    Ulcerative Colitis    Pain    Diarrhea       HPI    Inpatient course: Discharge summary reviewed- see chart. Interval history/Current status:   Patient reports that he was having some abdominal pain with diarrhea and some blood in his stool which took him to the hospital.  He had a colonoscopy performed and was diagnosed with ulcerative colitis. He states that they now have him on medication and he still having some diarrhea and some abdominal pain as well as occasionally some blood in his stool all of which has improved. He still having some issues eating and states that some things seem to give him more problems than others but is trying to avoid foods that give him problems. He states that he is otherwise doing well and denies any problems with his medications at this time. He is needing to get set up with GI for further evaluation and management. Review of Systems   Constitutional: Negative for activity change, appetite change, fatigue and fever. HENT: Negative for congestion, rhinorrhea and sore throat. Eyes: Negative for pain and discharge. Respiratory: Negative for cough, shortness of breath and wheezing. Cardiovascular: Negative for chest pain and palpitations. Gastrointestinal: Positive for abdominal pain, blood in stool and diarrhea. Negative for constipation, nausea and vomiting. Endocrine: Negative for cold intolerance and heat intolerance. Genitourinary: Negative for dysuria and hematuria. Musculoskeletal: Negative for back pain, gait problem and neck pain. Skin: Negative for rash and wound. Neurological: Negative for syncope and weakness. Hematological: Negative for adenopathy. Does not bruise/bleed easily. Psychiatric/Behavioral: Negative for dysphoric mood and sleep disturbance. The patient is not nervous/anxious. Vitals:    10/26/20 0748   BP: 108/70   Site: Left Upper Arm   Position: Sitting   Cuff Size: Medium Adult   Pulse: 82   Resp: 20   Temp: 97 °F (36.1 °C)   TempSrc: Oral   SpO2: 98%   Weight: 200 lb (90.7 kg)   Height: 5' 11\" (1.803 m)     Body mass index is 27.89 kg/m². Wt Readings from Last 3 Encounters:   10/26/20 200 lb (90.7 kg)   10/19/20 185 lb (83.9 kg)   06/04/19 187 lb (84.8 kg)     BP Readings from Last 3 Encounters:   10/26/20 108/70   10/22/20 111/67   10/21/20 92/63       Physical Exam  Vitals signs and nursing note reviewed. Constitutional:       General: He is not in acute distress. Appearance: Normal appearance. He is well-developed. He is not diaphoretic. HENT:      Head: Normocephalic and atraumatic. Right Ear: External ear normal.      Left Ear: External ear normal.      Mouth/Throat:      Comments: Mask in place  Eyes:      General: No scleral icterus. Right eye: No discharge. Left eye: No discharge. Conjunctiva/sclera: Conjunctivae normal.   Neck:      Musculoskeletal: Normal range of motion and neck supple. No muscular tenderness. Trachea: No tracheal deviation. Cardiovascular:      Rate and Rhythm: Normal rate and regular rhythm. Heart sounds: Normal heart sounds. No murmur. No friction rub. No gallop. Pulmonary:      Effort: Pulmonary effort is normal. No respiratory distress. Breath sounds: Normal breath sounds. No wheezing or rales. Abdominal:      General: Bowel sounds are normal. There is no distension. Palpations: Abdomen is soft. Tenderness: There is no abdominal tenderness. There is no guarding. Musculoskeletal: Normal range of motion.          General: No deformity (No gross deformities of upper or lower extremities) or signs of injury. Right lower leg: No edema. Left lower leg: No edema. Lymphadenopathy:      Cervical: No cervical adenopathy. Skin:     General: Skin is warm and dry. Findings: No erythema or rash. Neurological:      Mental Status: He is alert and oriented to person, place, and time. Cranial Nerves: No cranial nerve deficit. Psychiatric:         Mood and Affect: Mood normal.         Behavior: Behavior normal.         Thought Content: Thought content normal.             Assessment/Plan:  1. Acute ulcerative colitis with rectal bleeding (HCC)  We will attempt to get him set up with GI for further evaluation and management. Discussed dietary and lifestyle modifications that may be beneficial for his symptoms. We will continue to monitor and assist as needed.   - AL DISCHARGE MEDS RECONCILED W/ CURRENT OUTPATIENT MED LIST  - External Referral To Gastroenterology        Medical Decision Making: high complexity

## 2020-10-26 NOTE — PATIENT INSTRUCTIONS
Patient Education        Diet for Inflammatory Bowel Disease: Care Instructions  Your Care Instructions     Crohn's disease and ulcerative colitis are types of inflammatory bowel disease. What you eat doesn't increase the inflammation that causes your disease. But some types of foods, such as high-fiber fruits and vegetables, may make your symptoms worse. No one diet is right for everyone with an inflammatory bowel disease. Foods that bother one person may not bother another. Your diet has to be tailored for you. Follow-up care is a key part of your treatment and safety. Be sure to make and go to all appointments, and call your doctor if you are having problems. It's also a good idea to know your test results and keep a list of the medicines you take. How can you care for yourself at home? · Keep a food diary. As soon as you know what foods make your symptoms worse, your doctor or dietitian can help you plan the right diet for you. · During a flare-up, avoid or reduce foods that make symptoms worse. ? Choose dairy products that are low in lactose, such as yogurt, lactose-reduced milk, and hard cheeses like cheddar. ? If you have fat in your stools, choose low-fat foods instead of high-fat ones. For instance, some cuts of red meat have a lot of fat. A low-fat choice would be lean beef (such as sirloin, top and bottom round, davy, or diet lean hamburger), poultry, or fish, such as cod.  ? Instead of frying foods, try baking or broiling them. ? Cook fruits and vegetables without hulls, skins, or seeds. ? Try different ways of preparing fruits and vegetables, such as steaming, stewing, or baking. ? Peel and seed fresh fruits and vegetables if these bother you, or choose canned varieties. · Get the calories and nutrients you need. ? Eat a varied, nutritious diet that is high in calories and protein. ? Try eating 3 meals plus 2 or 3 snacks a day.  It may be easier to get more calories if you spread your food intake throughout the day. ? Take vitamin and mineral supplements if your doctor recommends them. ? Try adding high-calorie liquid supplements, such as Ensure Plus or Boost Plus, if you have trouble keeping your weight up.  ? See your doctor or dietitian if your diet feels too limited or you are losing weight. · Make sure to get enough iron. Rectal bleeding may make you lose iron. Good sources of iron include:  ? Beef. ? Lentils. ? Spinach. ? Raisins. ? Iron-enriched breads and cereals. When should you call for help? Watch closely for changes in your health, and be sure to contact your doctor if you have any problems. Where can you learn more? Go to https://[employer]. org and sign in to your [employer] account. Enter F732 in the [employer] box to learn more about \"Diet for Inflammatory Bowel Disease: Care Instructions. \"     If you do not have an account, please click on the \"Sign Up Now\" link. Current as of: August 22, 2019               Content Version: 12.6  © 8120-5137 [employer]. Care instructions adapted under license by [facility] ([facility] [place] If you have questions about a medical condition or this instruction, always ask your healthcare professional. [address] any warranty or liability for your use of this information.

## 2020-11-30 LAB
ALBUMIN SERPL-MCNC: 3.9 G/DL (ref 3.5–5.2)
ALP BLD-CCNC: 90 U/L (ref 40–130)
ALT SERPL-CCNC: 32 U/L (ref 5–41)
ANION GAP SERPL CALCULATED.3IONS-SCNC: 9 MMOL/L (ref 7–19)
AST SERPL-CCNC: 18 U/L (ref 5–40)
BASOPHILS ABSOLUTE: 0.1 K/UL (ref 0–0.2)
BASOPHILS RELATIVE PERCENT: 0.5 % (ref 0–1)
BILIRUB SERPL-MCNC: 0.3 MG/DL (ref 0.2–1.2)
BUN BLDV-MCNC: 11 MG/DL (ref 6–20)
C-REACTIVE PROTEIN: 0.18 MG/DL (ref 0–0.5)
CALCIUM SERPL-MCNC: 8.8 MG/DL (ref 8.6–10)
CHLORIDE BLD-SCNC: 106 MMOL/L (ref 98–111)
CO2: 27 MMOL/L (ref 22–29)
CREAT SERPL-MCNC: 0.7 MG/DL (ref 0.5–1.2)
EOSINOPHILS ABSOLUTE: 0.1 K/UL (ref 0–0.6)
EOSINOPHILS RELATIVE PERCENT: 1.4 % (ref 0–5)
GFR AFRICAN AMERICAN: >59
GFR NON-AFRICAN AMERICAN: >60
GLUCOSE BLD-MCNC: 79 MG/DL (ref 74–109)
HCT VFR BLD CALC: 36.6 % (ref 42–52)
HEMOGLOBIN: 11.7 G/DL (ref 14–18)
IMMATURE GRANULOCYTES #: 0 K/UL
LYMPHOCYTES ABSOLUTE: 3.1 K/UL (ref 1.1–4.5)
LYMPHOCYTES RELATIVE PERCENT: 30.2 % (ref 20–40)
MCH RBC QN AUTO: 28.7 PG (ref 27–31)
MCHC RBC AUTO-ENTMCNC: 32 G/DL (ref 33–37)
MCV RBC AUTO: 89.9 FL (ref 80–94)
MONOCYTES ABSOLUTE: 0.5 K/UL (ref 0–0.9)
MONOCYTES RELATIVE PERCENT: 5 % (ref 0–10)
NEUTROPHILS ABSOLUTE: 6.4 K/UL (ref 1.5–7.5)
NEUTROPHILS RELATIVE PERCENT: 62.7 % (ref 50–65)
PDW BLD-RTO: 16.1 % (ref 11.5–14.5)
PLATELET # BLD: 243 K/UL (ref 130–400)
PMV BLD AUTO: 9.3 FL (ref 9.4–12.4)
POTASSIUM SERPL-SCNC: 3.3 MMOL/L (ref 3.5–5)
RBC # BLD: 4.07 M/UL (ref 4.7–6.1)
SEDIMENTATION RATE, ERYTHROCYTE: 23 MM/HR (ref 0–10)
SODIUM BLD-SCNC: 142 MMOL/L (ref 136–145)
TOTAL PROTEIN: 6.3 G/DL (ref 6.6–8.7)
WBC # BLD: 10.2 K/UL (ref 4.8–10.8)

## 2020-12-02 LAB
QUANTI TB GOLD PLUS: NEGATIVE
QUANTI TB1 MINUS NIL: 0 IU/ML (ref 0–0.34)
QUANTI TB2 MINUS NIL: 0 IU/ML (ref 0–0.34)
QUANTIFERON MITOGEN: 7.24 IU/ML
QUANTIFERON NIL: 0.03 IU/ML

## 2021-02-11 DIAGNOSIS — L40.9 PSORIASIS: ICD-10-CM

## 2021-02-15 RX ORDER — CLOBETASOL PROPIONATE 0.5 MG/G
CREAM TOPICAL
Qty: 15 G | Refills: 2 | Status: SHIPPED | OUTPATIENT
Start: 2021-02-15 | End: 2022-09-29

## 2021-03-08 LAB
ALBUMIN SERPL-MCNC: 4.8 G/DL (ref 3.5–5.2)
ALP BLD-CCNC: 50 U/L (ref 40–130)
ALT SERPL-CCNC: 27 U/L (ref 5–41)
ANION GAP SERPL CALCULATED.3IONS-SCNC: 10 MMOL/L (ref 7–19)
AST SERPL-CCNC: 27 U/L (ref 5–40)
BASOPHILS ABSOLUTE: 0 K/UL (ref 0–0.2)
BASOPHILS RELATIVE PERCENT: 0.6 % (ref 0–1)
BILIRUB SERPL-MCNC: 0.3 MG/DL (ref 0.2–1.2)
BUN BLDV-MCNC: 18 MG/DL (ref 6–20)
C-REACTIVE PROTEIN: 0.06 MG/DL (ref 0–0.5)
CALCIUM SERPL-MCNC: 9.1 MG/DL (ref 8.6–10)
CHLORIDE BLD-SCNC: 104 MMOL/L (ref 98–111)
CO2: 26 MMOL/L (ref 22–29)
CREAT SERPL-MCNC: 0.9 MG/DL (ref 0.5–1.2)
EOSINOPHILS ABSOLUTE: 0.1 K/UL (ref 0–0.6)
EOSINOPHILS RELATIVE PERCENT: 2.2 % (ref 0–5)
GFR AFRICAN AMERICAN: >59
GFR NON-AFRICAN AMERICAN: >60
GLUCOSE BLD-MCNC: 102 MG/DL (ref 74–109)
HCT VFR BLD CALC: 40.2 % (ref 42–52)
HEMOGLOBIN: 13.1 G/DL (ref 14–18)
IMMATURE GRANULOCYTES #: 0 K/UL
LYMPHOCYTES ABSOLUTE: 2.4 K/UL (ref 1.1–4.5)
LYMPHOCYTES RELATIVE PERCENT: 37.6 % (ref 20–40)
MCH RBC QN AUTO: 29.4 PG (ref 27–31)
MCHC RBC AUTO-ENTMCNC: 32.6 G/DL (ref 33–37)
MCV RBC AUTO: 90.1 FL (ref 80–94)
MONOCYTES ABSOLUTE: 0.4 K/UL (ref 0–0.9)
MONOCYTES RELATIVE PERCENT: 5.6 % (ref 0–10)
NEUTROPHILS ABSOLUTE: 3.4 K/UL (ref 1.5–7.5)
NEUTROPHILS RELATIVE PERCENT: 53.7 % (ref 50–65)
PDW BLD-RTO: 13.1 % (ref 11.5–14.5)
PLATELET # BLD: 209 K/UL (ref 130–400)
PMV BLD AUTO: 10.7 FL (ref 9.4–12.4)
POTASSIUM SERPL-SCNC: 4.1 MMOL/L (ref 3.5–5)
RBC # BLD: 4.46 M/UL (ref 4.7–6.1)
SEDIMENTATION RATE, ERYTHROCYTE: 6 MM/HR (ref 0–10)
SODIUM BLD-SCNC: 140 MMOL/L (ref 136–145)
TOTAL PROTEIN: 6.8 G/DL (ref 6.6–8.7)
WBC # BLD: 6.3 K/UL (ref 4.8–10.8)

## 2021-06-28 LAB
ALBUMIN SERPL-MCNC: 5 G/DL (ref 3.5–5.2)
ALP BLD-CCNC: 44 U/L (ref 40–130)
ALT SERPL-CCNC: 25 U/L (ref 5–41)
ANION GAP SERPL CALCULATED.3IONS-SCNC: 12 MMOL/L (ref 7–19)
AST SERPL-CCNC: 35 U/L (ref 5–40)
BASOPHILS ABSOLUTE: 0.1 K/UL (ref 0–0.2)
BASOPHILS RELATIVE PERCENT: 1.1 % (ref 0–1)
BILIRUB SERPL-MCNC: 0.6 MG/DL (ref 0.2–1.2)
BUN BLDV-MCNC: 16 MG/DL (ref 6–20)
C-REACTIVE PROTEIN: <0.3 MG/DL (ref 0–0.5)
CALCIUM SERPL-MCNC: 9.1 MG/DL (ref 8.6–10)
CHLORIDE BLD-SCNC: 100 MMOL/L (ref 98–111)
CO2: 26 MMOL/L (ref 22–29)
CREAT SERPL-MCNC: 1 MG/DL (ref 0.5–1.2)
EOSINOPHILS ABSOLUTE: 0.2 K/UL (ref 0–0.6)
EOSINOPHILS RELATIVE PERCENT: 3.3 % (ref 0–5)
GFR AFRICAN AMERICAN: >59
GFR NON-AFRICAN AMERICAN: >60
GLUCOSE BLD-MCNC: 89 MG/DL (ref 74–109)
HCT VFR BLD CALC: 39 % (ref 42–52)
HEMOGLOBIN: 13.2 G/DL (ref 14–18)
IMMATURE GRANULOCYTES #: 0 K/UL
LYMPHOCYTES ABSOLUTE: 2.3 K/UL (ref 1.1–4.5)
LYMPHOCYTES RELATIVE PERCENT: 41.6 % (ref 20–40)
MCH RBC QN AUTO: 29.9 PG (ref 27–31)
MCHC RBC AUTO-ENTMCNC: 33.8 G/DL (ref 33–37)
MCV RBC AUTO: 88.4 FL (ref 80–94)
MONOCYTES ABSOLUTE: 0.4 K/UL (ref 0–0.9)
MONOCYTES RELATIVE PERCENT: 6.8 % (ref 0–10)
NEUTROPHILS ABSOLUTE: 2.6 K/UL (ref 1.5–7.5)
NEUTROPHILS RELATIVE PERCENT: 47 % (ref 50–65)
PDW BLD-RTO: 12.5 % (ref 11.5–14.5)
PLATELET # BLD: 153 K/UL (ref 130–400)
PMV BLD AUTO: 11.4 FL (ref 9.4–12.4)
POTASSIUM SERPL-SCNC: 3.9 MMOL/L (ref 3.5–5)
RBC # BLD: 4.41 M/UL (ref 4.7–6.1)
SEDIMENTATION RATE, ERYTHROCYTE: 6 MM/HR (ref 0–10)
SODIUM BLD-SCNC: 138 MMOL/L (ref 136–145)
TOTAL PROTEIN: 6.9 G/DL (ref 6.6–8.7)
WBC # BLD: 5.5 K/UL (ref 4.8–10.8)

## 2021-09-01 ENCOUNTER — OFFICE VISIT (OUTPATIENT)
Dept: FAMILY MEDICINE CLINIC | Age: 31
End: 2021-09-01
Payer: COMMERCIAL

## 2021-09-01 VITALS
SYSTOLIC BLOOD PRESSURE: 100 MMHG | DIASTOLIC BLOOD PRESSURE: 60 MMHG | WEIGHT: 203 LBS | RESPIRATION RATE: 16 BRPM | HEIGHT: 71 IN | HEART RATE: 67 BPM | OXYGEN SATURATION: 97 % | TEMPERATURE: 98.2 F | BODY MASS INDEX: 28.42 KG/M2

## 2021-09-01 DIAGNOSIS — Z00.00 ENCOUNTER FOR WELL ADULT EXAM WITHOUT ABNORMAL FINDINGS: Primary | ICD-10-CM

## 2021-09-01 PROCEDURE — 99395 PREV VISIT EST AGE 18-39: CPT | Performed by: FAMILY MEDICINE

## 2021-09-01 RX ORDER — INFLIXIMAB 100 MG/10ML
INJECTION, POWDER, LYOPHILIZED, FOR SOLUTION INTRAVENOUS
COMMUNITY

## 2021-09-01 ASSESSMENT — PATIENT HEALTH QUESTIONNAIRE - PHQ9
SUM OF ALL RESPONSES TO PHQ QUESTIONS 1-9: 0
1. LITTLE INTEREST OR PLEASURE IN DOING THINGS: 0
SUM OF ALL RESPONSES TO PHQ QUESTIONS 1-9: 0
2. FEELING DOWN, DEPRESSED OR HOPELESS: 0
SUM OF ALL RESPONSES TO PHQ9 QUESTIONS 1 & 2: 0
SUM OF ALL RESPONSES TO PHQ QUESTIONS 1-9: 0

## 2021-09-01 NOTE — PROGRESS NOTES
MG extended release capsule Take 9mg (three tablets) daily for one month, then 6mg (two tablets) daily for one month, then 3mg (one tablet daily) for one month  Kalia Garcia MD   mesalamine (CANASA) 1000 MG suppository Place 1 suppository rectally nightly  Kalia Garcia MD   ferrous sulfate (IRON 325) 325 (65 Fe) MG tablet Take 1 tablet by mouth daily (with breakfast)  Kalia Garcia MD   Multiple Vitamins-Minerals (THERAPEUTIC MULTIVITAMIN-MINERALS) tablet Take 1 tablet by mouth daily  Historical Provider, MD   traZODone (DESYREL) 50 MG tablet 1-2 tablets at bedtime as needed for sleep. Jaguar Toro MD        No Known Allergies    Past Medical History:   Diagnosis Date    Colitis        Past Surgical History:   Procedure Laterality Date    ANTERIOR CRUCIATE LIGAMENT REPAIR Left     COLONOSCOPY N/A 10/21/2020    Dr Benjie Sloan ulcerative colitis Browne score 7, Proctocolitis Browne score 7, repeat in 6 wks    CYST REMOVAL      ON LP    WISDOM TOOTH EXTRACTION         Social History     Socioeconomic History    Marital status: Single     Spouse name: Not on file    Number of children: 0    Years of education: Not on file    Highest education level: Not on file   Occupational History    Not on file   Tobacco Use    Smoking status: Never Smoker    Smokeless tobacco: Current User     Types: Chew   Vaping Use    Vaping Use: Never used   Substance and Sexual Activity    Alcohol use: Yes    Drug use: Never    Sexual activity: Not on file   Other Topics Concern    Not on file   Social History Narrative    Not on file     Social Determinants of Health     Financial Resource Strain:     Difficulty of Paying Living Expenses:    Food Insecurity:     Worried About Running Out of Food in the Last Year:     [address] in the Last Year:    Transportation Needs:     Lack of Transportation (Medical):      Lack of Transportation (Non-Medical):    Physical Activity:     Days of Exercise per Week:     Minutes of Exercise per Session:    Stress:     Feeling of Stress :    Social Connections:     Frequency of Communication with Friends and Family:     Frequency of Social Gatherings with Friends and Family:     Attends Rastafarian Services:     Active Member of Clubs or Organizations:     Attends Club or Organization Meetings:     Marital Status:    Intimate Partner Violence:     Fear of Current or Ex-Partner:     Emotionally Abused:     Physically Abused:     Sexually Abused:         Family History   Problem Relation Age of Onset    Thyroid Disease Mother     Atrial Fibrillation Father     Cancer Other         aunt and uncle colon cancer       ADVANCE DIRECTIVE: N, <no information>    Vitals:    09/01/21 1122   BP: 100/60   Site: Left Upper Arm   Position: Sitting   Cuff Size: Large Adult   Pulse: 67   Resp: 16   Temp: 98.2 °F (36.8 °C)   TempSrc: Skin   SpO2: 97%   Weight: 203 lb (92.1 kg)   Height: 5' 11\" (1.803 m)     Estimated body mass index is 28.31 kg/m² as calculated from the following:    Height as of this encounter: 5' 11\" (1.803 m). Weight as of this encounter: 203 lb (92.1 kg). Physical Exam  Vitals and nursing note reviewed. Constitutional:       General: He is not in acute distress. Appearance: Normal appearance. He is well-developed. He is not diaphoretic. HENT:      Head: Normocephalic and atraumatic. Right Ear: External ear normal.      Left Ear: External ear normal.      Nose: Nose normal.      Mouth/Throat:      Mouth: Mucous membranes are moist.      Pharynx: Oropharynx is clear. No oropharyngeal exudate. Eyes:      General: No scleral icterus. Right eye: No discharge. Left eye: No discharge. Conjunctiva/sclera: Conjunctivae normal.   Neck:      Trachea: No tracheal deviation. Cardiovascular:      Rate and Rhythm: Normal rate and regular rhythm. Heart sounds: Normal heart sounds. No murmur heard. No friction rub. No gallop. Pulmonary:      Effort: Pulmonary effort is normal. No respiratory distress. Breath sounds: Normal breath sounds. No wheezing or rales. Abdominal:      General: Bowel sounds are normal. There is no distension. Palpations: Abdomen is soft. Tenderness: There is no abdominal tenderness. Musculoskeletal:         General: No deformity (No gross deformities of upper or lower extremities) or signs of injury. Normal range of motion. Cervical back: Normal range of motion and neck supple. No muscular tenderness. Right lower leg: No edema. Left lower leg: No edema. Lymphadenopathy:      Cervical: No cervical adenopathy. Skin:     General: Skin is warm and dry. Findings: No erythema or rash. Neurological:      Mental Status: He is alert and oriented to person, place, and time. Cranial Nerves: No cranial nerve deficit. Psychiatric:         Mood and Affect: Mood normal.         Behavior: Behavior normal.         Thought Content: Thought content normal.         No flowsheet data found. Lab Results   Component Value Date    CHOL 265 06/04/2019    TRIG 66 06/04/2019    HDL 64 06/04/2019    LDLCALC 188 06/04/2019    GLUCOSE 89 06/28/2021       The ASCVD Risk score (Sussy Sexton., et al., 2013) failed to calculate for the following reasons:     The 2013 ASCVD risk score is only valid for ages 36 to 78    Immunization History   Administered Date(s) Administered    COVID-19, Moderna, PF, 100mcg/0.5mL 03/02/2021, 03/30/2021       Health Maintenance   Topic Date Due    Varicella vaccine (1 of 2 - 2-dose childhood series) Never done    HIV screen  Never done    DTaP/Tdap/Td vaccine (1 - Tdap) Never done    Flu vaccine (1) Never done    COVID-19 Vaccine  Completed    Hepatitis C screen  Completed    Hepatitis A vaccine  Aged Out    Hepatitis B vaccine  Aged Out    Hib vaccine  Aged Out    Meningococcal (ACWY) vaccine  Aged Out    Pneumococcal 0-64 years Vaccine  Aged Out ASSESSMENT/PLAN:  1. Encounter for well adult exam without abnormal findings    Form filled out for new employee physical scanned into medical record and given patient for his employer. Discussed age-appropriate anticipatory topics (including continue healthy diet and good water intake, limit soda and junk food, regular exercise, dental care) and gave handout on age approp topics      Return in about 1 year (around 9/1/2022), or if symptoms worsen or fail to improve. An electronic signature was used to authenticate this note.     --Tay Sanabria MD on 9/30/2021 at 10:50 PM

## 2021-09-30 ASSESSMENT — ENCOUNTER SYMPTOMS
NAUSEA: 0
BACK PAIN: 0
DIARRHEA: 0
COUGH: 0
SHORTNESS OF BREATH: 0
CONSTIPATION: 0
EYE DISCHARGE: 0
ABDOMINAL PAIN: 0
SORE THROAT: 0
WHEEZING: 0
EYE PAIN: 0
VOMITING: 0
RHINORRHEA: 0

## 2021-10-01 NOTE — PATIENT INSTRUCTIONS
Patient Education        Well Visit, Ages 25 to 48: Care Instructions  Overview     Well visits can help you stay healthy. Your doctor has checked your overall health and may have suggested ways to take good care of yourself. Your doctor also may have recommended tests. At home, you can help prevent illness with healthy eating, regular exercise, and other steps. Follow-up care is a key part of your treatment and safety. Be sure to make and go to all appointments, and call your doctor if you are having problems. It's also a good idea to know your test results and keep a list of the medicines you take. How can you care for yourself at home? · Get screening tests that you and your doctor decide on. Screening helps find diseases before any symptoms appear. · Eat healthy foods. Choose fruits, vegetables, whole grains, protein, and low-fat dairy foods. Limit fat, especially saturated fat. Reduce salt in your diet. · Limit alcohol. If you are a man, have no more than 2 drinks a day or 14 drinks a week. If you are a woman, have no more than 1 drink a day or 7 drinks a week. · Get at least 30 minutes of physical activity on most days of the week. Walking is a good choice. You also may want to do other activities, such as running, swimming, cycling, or playing tennis or team sports. Discuss any changes in your exercise program with your doctor. · Reach and stay at a healthy weight. This will lower your risk for many problems, such as obesity, diabetes, heart disease, and high blood pressure. · Do not smoke or allow others to smoke around you. If you need help quitting, talk to your doctor about stop-smoking programs and medicines. These can increase your chances of quitting for good. · Care for your mental health. It is easy to get weighed down by worry and stress. Learn strategies to manage stress, like deep breathing and mindfulness, and stay connected with your family and community.  If you find you often feel sad or hopeless, talk with your doctor. Treatment can help. · Talk to your doctor about whether you have any risk factors for sexually transmitted infections (STIs). You can help prevent STIs if you wait to have sex with a new partner (or partners) until you've each been tested for STIs. It also helps if you use condoms (male or female condoms) and if you limit your sex partners to one person who only has sex with you. Vaccines are available for some STIs, such as HPV. · Use birth control if it's important to you to prevent pregnancy. Talk with your doctor about the choices available and what might be best for you. · If you think you may have a problem with alcohol or drug use, talk to your doctor. This includes prescription medicines (such as amphetamines and opioids) and illegal drugs (such as cocaine and methamphetamine). Your doctor can help you figure out what type of treatment is best for you. · Protect your skin from too much sun. When you're outdoors from 10 a.m. to 4 p.m., stay in the shade or cover up with clothing and a hat with a wide brim. Wear sunglasses that block UV rays. Even when it's cloudy, put broad-spectrum sunscreen (SPF 30 or higher) on any exposed skin. · See a dentist one or two times a year for checkups and to have your teeth cleaned. · Wear a seat belt in the car. When should you call for help? Watch closely for changes in your health, and be sure to contact your doctor if you have any problems or symptoms that concern you. Where can you learn more? Go to https://[email or web address].[email or web address] org and sign in to your [employer] account. Enter P072 in the [employer] box to learn more about \"Well Visit, Ages 25 to 48: Care Instructions. \"     If you do not have an account, please click on the \"Sign Up Now\" link. Current as of: February 11, 2021               Content Version: 13.0  © 7089-6739 Healthwise, Incorporated.    Care instructions adapted under license by [facility]. If you have questions about a medical condition or this instruction, always ask your healthcare professional. [place] [address] any warranty or liability for your use of this information.

## 2021-10-15 LAB
ALBUMIN SERPL-MCNC: 4.9 G/DL (ref 3.5–5.2)
ALP BLD-CCNC: 78 U/L (ref 40–130)
ALT SERPL-CCNC: 48 U/L (ref 5–41)
ANION GAP SERPL CALCULATED.3IONS-SCNC: 13 MMOL/L (ref 7–19)
AST SERPL-CCNC: 35 U/L (ref 5–40)
BASOPHILS ABSOLUTE: 0 K/UL (ref 0–0.2)
BASOPHILS RELATIVE PERCENT: 0.5 % (ref 0–1)
BILIRUB SERPL-MCNC: <0.2 MG/DL (ref 0.2–1.2)
BUN BLDV-MCNC: 20 MG/DL (ref 6–20)
C-REACTIVE PROTEIN: <0.3 MG/DL (ref 0–0.5)
CALCIUM SERPL-MCNC: 9.7 MG/DL (ref 8.6–10)
CHLORIDE BLD-SCNC: 102 MMOL/L (ref 98–111)
CO2: 23 MMOL/L (ref 22–29)
CREAT SERPL-MCNC: 0.9 MG/DL (ref 0.5–1.2)
EOSINOPHILS ABSOLUTE: 0.9 K/UL (ref 0–0.6)
EOSINOPHILS RELATIVE PERCENT: 11.2 % (ref 0–5)
GFR AFRICAN AMERICAN: >59
GFR NON-AFRICAN AMERICAN: >60
GLUCOSE BLD-MCNC: 77 MG/DL (ref 74–109)
HCT VFR BLD CALC: 40.6 % (ref 42–52)
HEMOGLOBIN: 13.3 G/DL (ref 14–18)
IMMATURE GRANULOCYTES #: 0 K/UL
LYMPHOCYTES ABSOLUTE: 2.5 K/UL (ref 1.1–4.5)
LYMPHOCYTES RELATIVE PERCENT: 30.7 % (ref 20–40)
MCH RBC QN AUTO: 29.5 PG (ref 27–31)
MCHC RBC AUTO-ENTMCNC: 32.8 G/DL (ref 33–37)
MCV RBC AUTO: 90 FL (ref 80–94)
MONOCYTES ABSOLUTE: 0.6 K/UL (ref 0–0.9)
MONOCYTES RELATIVE PERCENT: 6.9 % (ref 0–10)
NEUTROPHILS ABSOLUTE: 4.1 K/UL (ref 1.5–7.5)
NEUTROPHILS RELATIVE PERCENT: 50.5 % (ref 50–65)
PDW BLD-RTO: 12.6 % (ref 11.5–14.5)
PLATELET # BLD: 205 K/UL (ref 130–400)
PMV BLD AUTO: 10.6 FL (ref 9.4–12.4)
POTASSIUM SERPL-SCNC: 4.4 MMOL/L (ref 3.5–5)
RBC # BLD: 4.51 M/UL (ref 4.7–6.1)
SEDIMENTATION RATE, ERYTHROCYTE: 8 MM/HR (ref 0–10)
SODIUM BLD-SCNC: 138 MMOL/L (ref 136–145)
TOTAL PROTEIN: 7.3 G/DL (ref 6.6–8.7)
WBC # BLD: 8.2 K/UL (ref 4.8–10.8)

## 2022-02-04 LAB
ALBUMIN SERPL-MCNC: 4.9 G/DL (ref 3.5–5.2)
ALP BLD-CCNC: 43 U/L (ref 40–130)
ALT SERPL-CCNC: 41 U/L (ref 5–41)
ANION GAP SERPL CALCULATED.3IONS-SCNC: 12 MMOL/L (ref 7–19)
AST SERPL-CCNC: 32 U/L (ref 5–40)
BASOPHILS ABSOLUTE: 0.1 K/UL (ref 0–0.2)
BASOPHILS RELATIVE PERCENT: 1 % (ref 0–1)
BILIRUB SERPL-MCNC: 0.5 MG/DL (ref 0.2–1.2)
BUN BLDV-MCNC: 14 MG/DL (ref 6–20)
C-REACTIVE PROTEIN: <0.3 MG/DL (ref 0–0.5)
CALCIUM SERPL-MCNC: 9.4 MG/DL (ref 8.6–10)
CHLORIDE BLD-SCNC: 102 MMOL/L (ref 98–111)
CO2: 27 MMOL/L (ref 22–29)
CREAT SERPL-MCNC: 1.1 MG/DL (ref 0.5–1.2)
EOSINOPHILS ABSOLUTE: 0.2 K/UL (ref 0–0.6)
EOSINOPHILS RELATIVE PERCENT: 3.3 % (ref 0–5)
FERRITIN: 431.8 NG/ML (ref 30–400)
GFR AFRICAN AMERICAN: >59
GFR NON-AFRICAN AMERICAN: >60
GLUCOSE BLD-MCNC: 79 MG/DL (ref 74–109)
HCT VFR BLD CALC: 41.8 % (ref 42–52)
HEMOGLOBIN: 13.7 G/DL (ref 14–18)
IMMATURE GRANULOCYTES #: 0 K/UL
IRON SATURATION: 43 % (ref 14–50)
IRON: 121 UG/DL (ref 59–158)
LYMPHOCYTES ABSOLUTE: 2.5 K/UL (ref 1.1–4.5)
LYMPHOCYTES RELATIVE PERCENT: 40.5 % (ref 20–40)
MCH RBC QN AUTO: 29.1 PG (ref 27–31)
MCHC RBC AUTO-ENTMCNC: 32.8 G/DL (ref 33–37)
MCV RBC AUTO: 88.9 FL (ref 80–94)
MONOCYTES ABSOLUTE: 0.4 K/UL (ref 0–0.9)
MONOCYTES RELATIVE PERCENT: 6.9 % (ref 0–10)
NEUTROPHILS ABSOLUTE: 2.9 K/UL (ref 1.5–7.5)
NEUTROPHILS RELATIVE PERCENT: 48.1 % (ref 50–65)
PDW BLD-RTO: 12.6 % (ref 11.5–14.5)
PLATELET # BLD: 238 K/UL (ref 130–400)
PMV BLD AUTO: 10.6 FL (ref 9.4–12.4)
POTASSIUM SERPL-SCNC: 3.8 MMOL/L (ref 3.5–5)
RBC # BLD: 4.7 M/UL (ref 4.7–6.1)
SEDIMENTATION RATE, ERYTHROCYTE: 8 MM/HR (ref 0–10)
SODIUM BLD-SCNC: 141 MMOL/L (ref 136–145)
TOTAL IRON BINDING CAPACITY: 281 UG/DL (ref 250–400)
TOTAL PROTEIN: 7.1 G/DL (ref 6.6–8.7)
VITAMIN B-12: 730 PG/ML (ref 211–946)
VITAMIN D 25-HYDROXY: 10.7 NG/ML
WBC # BLD: 6.1 K/UL (ref 4.8–10.8)

## 2022-03-24 ENCOUNTER — HOSPITAL ENCOUNTER (OUTPATIENT)
Dept: GENERAL RADIOLOGY | Age: 32
Discharge: HOME | End: 2022-03-24
Payer: COMMERCIAL

## 2022-03-24 ENCOUNTER — OFFICE VISIT (OUTPATIENT)
Dept: FAMILY MEDICINE CLINIC | Age: 32
End: 2022-03-24
Payer: COMMERCIAL

## 2022-03-24 VITALS
WEIGHT: 210 LBS | TEMPERATURE: 98 F | HEART RATE: 80 BPM | OXYGEN SATURATION: 98 % | BODY MASS INDEX: 29.29 KG/M2 | DIASTOLIC BLOOD PRESSURE: 76 MMHG | SYSTOLIC BLOOD PRESSURE: 124 MMHG

## 2022-03-24 DIAGNOSIS — M54.16 LUMBAR BACK PAIN WITH RADICULOPATHY AFFECTING LEFT LOWER EXTREMITY: Primary | ICD-10-CM

## 2022-03-24 DIAGNOSIS — M54.16 LUMBAR BACK PAIN WITH RADICULOPATHY AFFECTING LEFT LOWER EXTREMITY: ICD-10-CM

## 2022-03-24 PROCEDURE — 72100 X-RAY EXAM L-S SPINE 2/3 VWS: CPT

## 2022-03-24 PROCEDURE — 99213 OFFICE O/P EST LOW 20 MIN: CPT | Performed by: NURSE PRACTITIONER

## 2022-03-24 RX ORDER — ERGOCALCIFEROL 1.25 MG/1
50000 CAPSULE ORAL WEEKLY
COMMUNITY

## 2022-03-24 RX ORDER — CYCLOBENZAPRINE HCL 10 MG
10 TABLET ORAL 3 TIMES DAILY PRN
Qty: 30 TABLET | Refills: 0 | Status: SHIPPED | OUTPATIENT
Start: 2022-03-24 | End: 2022-04-03

## 2022-03-24 RX ORDER — PREDNISONE 20 MG/1
20 TABLET ORAL 2 TIMES DAILY
Qty: 10 TABLET | Refills: 0 | Status: SHIPPED | OUTPATIENT
Start: 2022-03-24 | End: 2022-03-29

## 2022-03-24 ASSESSMENT — ENCOUNTER SYMPTOMS: BACK PAIN: 1

## 2022-03-24 NOTE — PROGRESS NOTES
SUBJECTIVE:  Back pain  Patient ID: Ata Hill is a 34 y.o. male. HPI:   HPI   Last year gets back spasms. Hasn't been as bad as they are right now. Sharp pain with walking left hip down the leg. No injury   Tuesday morning developed with working sitting in chair. Radiates down leg. Back stops at the knee. Has not been lifting weights  in the past month. Seems a lot worse. Difficulty walking leaning to the side or leaning forward. Lying on back it doesn't hurt. Past Medical History:   Diagnosis Date    Colitis       Prior to Visit Medications    Medication Sig Taking? Authorizing Provider   vitamin D (ERGOCALCIFEROL) 1.25 MG (21248 UT) CAPS capsule Take 50,000 Units by mouth once a week Yes Historical Provider, MD   cyclobenzaprine (FLEXERIL) 10 MG tablet Take 1 tablet by mouth 3 times daily as needed for Muscle spasms Yes TAYLOR Young   predniSONE (DELTASONE) 20 MG tablet Take 1 tablet by mouth 2 times daily for 5 days Yes TAYLOR Young   inFLIXimab (REMICADE) 100 MG injection Infuse intravenously Yes Historical Provider, MD   clobetasol (TEMOVATE) 0.05 % cream APPLY TOPICALLY 2 TIMES DAILY. Yes Luther Flores MD   Multiple Vitamins-Minerals (THERAPEUTIC MULTIVITAMIN-MINERALS) tablet Take 1 tablet by mouth daily Yes Historical Provider, MD      No Known Allergies    Review of Systems   Constitutional: Negative for fever. Musculoskeletal: Positive for arthralgias, back pain, gait problem and myalgias. OBJECTIVE:    Physical Exam  Constitutional:       Appearance: He is well-developed. HENT:      Head: Normocephalic. Eyes:      Conjunctiva/sclera: Conjunctivae normal.   Cardiovascular:      Rate and Rhythm: Normal rate and regular rhythm. Heart sounds: Normal heart sounds. Pulmonary:      Effort: Pulmonary effort is normal.      Breath sounds: Normal breath sounds. Abdominal:      General: Bowel sounds are normal.      Palpations: Abdomen is soft. Tenderness: There is no abdominal tenderness. There is no guarding. Musculoskeletal:         General: No swelling, deformity or signs of injury. Right shoulder: No tenderness. Normal range of motion. Cervical back: Normal range of motion and neck supple. Lumbar back: Swelling, spasms and tenderness present. No signs of trauma. Decreased range of motion. Back:       Right lower leg: No edema. Left lower leg: No edema. Skin:     General: Skin is warm and dry. Neurological:      Mental Status: He is alert and oriented to person, place, and time. Motor: Motor function is intact. No weakness, tremor or abnormal muscle tone. Coordination: Coordination is intact. Coordination normal.      Gait: Gait is intact. Deep Tendon Reflexes: Reflexes are normal and symmetric. Reflex Scores:       Patellar reflexes are 2+ on the right side and 2+ on the left side. Achilles reflexes are 2+ on the right side and 2+ on the left side. Psychiatric:         Behavior: Behavior normal.        /76 (Site: Right Upper Arm, Position: Sitting, Cuff Size: Medium Adult)   Pulse 80   Temp 98 °F (36.7 °C) (Temporal)   Wt 210 lb (95.3 kg)   SpO2 98%   BMI 29.29 kg/m²      ASSESSMENT:      ICD-10-CM    1. Lumbar back pain with radiculopathy affecting left lower extremity  M54.16 cyclobenzaprine (FLEXERIL) 10 MG tablet     predniSONE (DELTASONE) 20 MG tablet     XR LUMBAR SPINE (2-3 VIEWS)       PLAN:    Linette Fallon: Back Pain (every couple months would get back spasms but now is alot worse)  review XR if no improvement MRi or PT     Diagnosis and orders for this visit are above.

## 2022-05-26 LAB
ALBUMIN SERPL-MCNC: 5.1 G/DL (ref 3.5–5.2)
ALP BLD-CCNC: 40 U/L (ref 40–130)
ALT SERPL-CCNC: 64 U/L (ref 5–41)
ANION GAP SERPL CALCULATED.3IONS-SCNC: 16 MMOL/L (ref 7–19)
AST SERPL-CCNC: 52 U/L (ref 5–40)
BASOPHILS ABSOLUTE: 0.1 K/UL (ref 0–0.2)
BASOPHILS RELATIVE PERCENT: 1.2 % (ref 0–1)
BILIRUB SERPL-MCNC: 0.5 MG/DL (ref 0.2–1.2)
BUN BLDV-MCNC: 21 MG/DL (ref 6–20)
C-REACTIVE PROTEIN: <0.3 MG/DL (ref 0–0.5)
CALCIUM SERPL-MCNC: 9.8 MG/DL (ref 8.6–10)
CHLORIDE BLD-SCNC: 104 MMOL/L (ref 98–111)
CO2: 20 MMOL/L (ref 22–29)
CREAT SERPL-MCNC: 1.1 MG/DL (ref 0.5–1.2)
EOSINOPHILS ABSOLUTE: 0.4 K/UL (ref 0–0.6)
EOSINOPHILS RELATIVE PERCENT: 6 % (ref 0–5)
GFR AFRICAN AMERICAN: >59
GFR NON-AFRICAN AMERICAN: >60
GLUCOSE BLD-MCNC: 100 MG/DL (ref 74–109)
HCT VFR BLD CALC: 43.7 % (ref 42–52)
HEMOGLOBIN: 14.2 G/DL (ref 14–18)
IMMATURE GRANULOCYTES #: 0 K/UL
LYMPHOCYTES ABSOLUTE: 2.1 K/UL (ref 1.1–4.5)
LYMPHOCYTES RELATIVE PERCENT: 35.2 % (ref 20–40)
Lab: NORMAL
MCH RBC QN AUTO: 29.7 PG (ref 27–31)
MCHC RBC AUTO-ENTMCNC: 32.5 G/DL (ref 33–37)
MCV RBC AUTO: 91.4 FL (ref 80–94)
MONOCYTES ABSOLUTE: 0.4 K/UL (ref 0–0.9)
MONOCYTES RELATIVE PERCENT: 6.8 % (ref 0–10)
NEUTROPHILS ABSOLUTE: 3.1 K/UL (ref 1.5–7.5)
NEUTROPHILS RELATIVE PERCENT: 50.6 % (ref 50–65)
PDW BLD-RTO: 12.9 % (ref 11.5–14.5)
PLATELET # BLD: 218 K/UL (ref 130–400)
PMV BLD AUTO: 9.9 FL (ref 9.4–12.4)
POTASSIUM SERPL-SCNC: 4.1 MMOL/L (ref 3.5–5)
RBC # BLD: 4.78 M/UL (ref 4.7–6.1)
REPORT: NORMAL
SEDIMENTATION RATE, ERYTHROCYTE: 5 MM/HR (ref 0–10)
SODIUM BLD-SCNC: 140 MMOL/L (ref 136–145)
THIS TEST SENT TO: NORMAL
TOTAL PROTEIN: 7.5 G/DL (ref 6.6–8.7)
WBC # BLD: 6 K/UL (ref 4.8–10.8)

## 2022-05-29 LAB
QUANTI TB GOLD PLUS: NEGATIVE
QUANTI TB1 MINUS NIL: 0.01 IU/ML (ref 0–0.34)
QUANTI TB2 MINUS NIL: 0.01 IU/ML (ref 0–0.34)
QUANTIFERON MITOGEN: >10 IU/ML
QUANTIFERON NIL: 0.02 IU/ML

## 2022-05-30 LAB — MISCELLANEOUS LAB TEST ORDER: NORMAL

## 2022-09-19 LAB
ALBUMIN SERPL-MCNC: 4.9 G/DL (ref 3.5–5.2)
ALP BLD-CCNC: 43 U/L (ref 40–130)
ALT SERPL-CCNC: 63 U/L (ref 5–41)
ANION GAP SERPL CALCULATED.3IONS-SCNC: 9 MMOL/L (ref 7–19)
AST SERPL-CCNC: 46 U/L (ref 5–40)
BASOPHILS ABSOLUTE: 0.1 K/UL (ref 0–0.2)
BASOPHILS RELATIVE PERCENT: 0.9 % (ref 0–1)
BILIRUB SERPL-MCNC: 0.5 MG/DL (ref 0.2–1.2)
BUN BLDV-MCNC: 14 MG/DL (ref 6–20)
C-REACTIVE PROTEIN: <0.3 MG/DL (ref 0–0.5)
CALCIUM SERPL-MCNC: 9.4 MG/DL (ref 8.6–10)
CHLORIDE BLD-SCNC: 105 MMOL/L (ref 98–111)
CO2: 29 MMOL/L (ref 22–29)
CREAT SERPL-MCNC: 0.9 MG/DL (ref 0.5–1.2)
EOSINOPHILS ABSOLUTE: 0.2 K/UL (ref 0–0.6)
EOSINOPHILS RELATIVE PERCENT: 2.7 % (ref 0–5)
GFR AFRICAN AMERICAN: >59
GFR NON-AFRICAN AMERICAN: >60
GLUCOSE BLD-MCNC: 82 MG/DL (ref 74–109)
HCT VFR BLD CALC: 43.2 % (ref 42–52)
HEMOGLOBIN: 14.1 G/DL (ref 14–18)
IMMATURE GRANULOCYTES #: 0 K/UL
LYMPHOCYTES ABSOLUTE: 2.6 K/UL (ref 1.1–4.5)
LYMPHOCYTES RELATIVE PERCENT: 40.7 % (ref 20–40)
MCH RBC QN AUTO: 29.8 PG (ref 27–31)
MCHC RBC AUTO-ENTMCNC: 32.6 G/DL (ref 33–37)
MCV RBC AUTO: 91.3 FL (ref 80–94)
MONOCYTES ABSOLUTE: 0.4 K/UL (ref 0–0.9)
MONOCYTES RELATIVE PERCENT: 6.8 % (ref 0–10)
NEUTROPHILS ABSOLUTE: 3.1 K/UL (ref 1.5–7.5)
NEUTROPHILS RELATIVE PERCENT: 48.7 % (ref 50–65)
PDW BLD-RTO: 12.8 % (ref 11.5–14.5)
PLATELET # BLD: 198 K/UL (ref 130–400)
PMV BLD AUTO: 10.9 FL (ref 9.4–12.4)
POTASSIUM SERPL-SCNC: 3.5 MMOL/L (ref 3.5–5)
RBC # BLD: 4.73 M/UL (ref 4.7–6.1)
SEDIMENTATION RATE, ERYTHROCYTE: 5 MM/HR (ref 0–10)
SODIUM BLD-SCNC: 143 MMOL/L (ref 136–145)
TOTAL PROTEIN: 7.1 G/DL (ref 6.6–8.7)
WBC # BLD: 6.3 K/UL (ref 4.8–10.8)

## 2022-09-29 DIAGNOSIS — L40.9 PSORIASIS: ICD-10-CM

## 2022-09-29 RX ORDER — CLOBETASOL PROPIONATE 0.5 MG/G
CREAM TOPICAL
Qty: 15 G | Refills: 2 | Status: SHIPPED | OUTPATIENT
Start: 2022-09-29

## 2022-09-29 NOTE — TELEPHONE ENCOUNTER
Last OV 3/24/2022  Next OV Visit date not found      Requested Prescriptions     Pending Prescriptions Disp Refills    clobetasol (TEMOVATE) 0.05 % cream [Pharmacy Med Name: CLOBETASOL PROPIONATE 0.05 0.05 Cream] 15 g 2     Sig: APPLY TOPICALLY TO AFFECTED AREA TWICE A DAY

## 2022-10-28 LAB
ALBUMIN SERPL-MCNC: 5 G/DL (ref 3.5–5.2)
ALP BLD-CCNC: 41 U/L (ref 40–130)
ALT SERPL-CCNC: 28 U/L (ref 5–41)
ANION GAP SERPL CALCULATED.3IONS-SCNC: 11 MMOL/L (ref 7–19)
AST SERPL-CCNC: 23 U/L (ref 5–40)
BILIRUB SERPL-MCNC: 0.5 MG/DL (ref 0.2–1.2)
BUN BLDV-MCNC: 18 MG/DL (ref 6–20)
CALCIUM SERPL-MCNC: 9.9 MG/DL (ref 8.6–10)
CHLORIDE BLD-SCNC: 102 MMOL/L (ref 98–111)
CO2: 29 MMOL/L (ref 22–29)
CREAT SERPL-MCNC: 1.1 MG/DL (ref 0.5–1.2)
GFR SERPL CREATININE-BSD FRML MDRD: >60 ML/MIN/{1.73_M2}
GLUCOSE BLD-MCNC: 82 MG/DL (ref 74–109)
POTASSIUM SERPL-SCNC: 4 MMOL/L (ref 3.5–5)
SODIUM BLD-SCNC: 142 MMOL/L (ref 136–145)
TOTAL PROTEIN: 7.5 G/DL (ref 6.6–8.7)

## 2022-11-18 LAB — VITAMIN D 25-HYDROXY: 40.1 NG/ML

## 2023-01-13 LAB
ALBUMIN SERPL-MCNC: 5 G/DL (ref 3.5–5.2)
ALP BLD-CCNC: 42 U/L (ref 40–130)
ALT SERPL-CCNC: 21 U/L (ref 5–41)
ANION GAP SERPL CALCULATED.3IONS-SCNC: 11 MMOL/L (ref 7–19)
AST SERPL-CCNC: 25 U/L (ref 5–40)
BASOPHILS ABSOLUTE: 0 K/UL (ref 0–0.2)
BASOPHILS RELATIVE PERCENT: 0.4 % (ref 0–1)
BILIRUB SERPL-MCNC: 0.5 MG/DL (ref 0.2–1.2)
BUN BLDV-MCNC: 23 MG/DL (ref 6–20)
C-REACTIVE PROTEIN: <0.3 MG/DL (ref 0–0.5)
CALCIUM SERPL-MCNC: 9.8 MG/DL (ref 8.6–10)
CHLORIDE BLD-SCNC: 102 MMOL/L (ref 98–111)
CO2: 26 MMOL/L (ref 22–29)
CREAT SERPL-MCNC: 1 MG/DL (ref 0.5–1.2)
EOSINOPHILS ABSOLUTE: 0.3 K/UL (ref 0–0.6)
EOSINOPHILS RELATIVE PERCENT: 3.4 % (ref 0–5)
GFR SERPL CREATININE-BSD FRML MDRD: >60 ML/MIN/{1.73_M2}
GLUCOSE BLD-MCNC: 89 MG/DL (ref 74–109)
HCT VFR BLD CALC: 40.9 % (ref 42–52)
HEMOGLOBIN: 13.9 G/DL (ref 14–18)
IMMATURE GRANULOCYTES #: 0 K/UL
LYMPHOCYTES ABSOLUTE: 1 K/UL (ref 1.1–4.5)
LYMPHOCYTES RELATIVE PERCENT: 11.3 % (ref 20–40)
MCH RBC QN AUTO: 30 PG (ref 27–31)
MCHC RBC AUTO-ENTMCNC: 34 G/DL (ref 33–37)
MCV RBC AUTO: 88.3 FL (ref 80–94)
MONOCYTES ABSOLUTE: 0.2 K/UL (ref 0–0.9)
MONOCYTES RELATIVE PERCENT: 2.5 % (ref 0–10)
NEUTROPHILS ABSOLUTE: 6.9 K/UL (ref 1.5–7.5)
NEUTROPHILS RELATIVE PERCENT: 82 % (ref 50–65)
PDW BLD-RTO: 12.6 % (ref 11.5–14.5)
PLATELET # BLD: 212 K/UL (ref 130–400)
PMV BLD AUTO: 10.3 FL (ref 9.4–12.4)
POTASSIUM SERPL-SCNC: 4.1 MMOL/L (ref 3.5–5)
RBC # BLD: 4.63 M/UL (ref 4.7–6.1)
SEDIMENTATION RATE, ERYTHROCYTE: 8 MM/HR (ref 0–10)
SODIUM BLD-SCNC: 139 MMOL/L (ref 136–145)
TOTAL PROTEIN: 7.7 G/DL (ref 6.6–8.7)
VITAMIN D 25-HYDROXY: 42.5 NG/ML
WBC # BLD: 8.4 K/UL (ref 4.8–10.8)

## 2023-05-05 LAB
ALBUMIN SERPL-MCNC: 4.8 G/DL (ref 3.5–5.2)
ALP SERPL-CCNC: 43 U/L (ref 40–130)
ALT SERPL-CCNC: 26 U/L (ref 5–41)
ANION GAP SERPL CALCULATED.3IONS-SCNC: 10 MMOL/L (ref 7–19)
AST SERPL-CCNC: 25 U/L (ref 5–40)
BASOPHILS # BLD: 0.1 K/UL (ref 0–0.2)
BASOPHILS NFR BLD: 0.6 % (ref 0–1)
BILIRUB SERPL-MCNC: 0.4 MG/DL (ref 0.2–1.2)
BUN SERPL-MCNC: 30 MG/DL (ref 6–20)
CALCIUM SERPL-MCNC: 9.8 MG/DL (ref 8.6–10)
CHLORIDE SERPL-SCNC: 104 MMOL/L (ref 98–111)
CO2 SERPL-SCNC: 26 MMOL/L (ref 22–29)
CREAT SERPL-MCNC: 1.2 MG/DL (ref 0.5–1.2)
CRP SERPL HS-MCNC: <0.3 MG/DL (ref 0–0.5)
EOSINOPHIL # BLD: 0.2 K/UL (ref 0–0.6)
EOSINOPHIL NFR BLD: 1.9 % (ref 0–5)
ERYTHROCYTE [DISTWIDTH] IN BLOOD BY AUTOMATED COUNT: 12.5 % (ref 11.5–14.5)
ERYTHROCYTE [SEDIMENTATION RATE] IN BLOOD BY WESTERGREN METHOD: 8 MM/HR (ref 0–10)
GLUCOSE SERPL-MCNC: 89 MG/DL (ref 74–109)
HCT VFR BLD AUTO: 43.7 % (ref 42–52)
HGB BLD-MCNC: 14.3 G/DL (ref 14–18)
IMM GRANULOCYTES # BLD: 0 K/UL
LYMPHOCYTES # BLD: 1.7 K/UL (ref 1.1–4.5)
LYMPHOCYTES NFR BLD: 20.1 % (ref 20–40)
MCH RBC QN AUTO: 29.2 PG (ref 27–31)
MCHC RBC AUTO-ENTMCNC: 32.7 G/DL (ref 33–37)
MCV RBC AUTO: 89.4 FL (ref 80–94)
MONOCYTES # BLD: 0.3 K/UL (ref 0–0.9)
MONOCYTES NFR BLD: 3.2 % (ref 0–10)
NEUTROPHILS # BLD: 6.2 K/UL (ref 1.5–7.5)
NEUTS SEG NFR BLD: 73.8 % (ref 50–65)
PLATELET # BLD AUTO: 236 K/UL (ref 130–400)
PMV BLD AUTO: 9.8 FL (ref 9.4–12.4)
POTASSIUM SERPL-SCNC: 4.7 MMOL/L (ref 3.5–5)
PROT SERPL-MCNC: 7.6 G/DL (ref 6.6–8.7)
RBC # BLD AUTO: 4.89 M/UL (ref 4.7–6.1)
SODIUM SERPL-SCNC: 140 MMOL/L (ref 136–145)
WBC # BLD AUTO: 8.4 K/UL (ref 4.8–10.8)

## 2023-06-26 DIAGNOSIS — L40.9 PSORIASIS: ICD-10-CM

## 2023-06-27 RX ORDER — CLOBETASOL PROPIONATE 0.5 MG/G
CREAM TOPICAL
Qty: 15 G | Refills: 2 | OUTPATIENT
Start: 2023-06-27

## 2023-08-25 LAB
ALBUMIN SERPL-MCNC: 4.8 G/DL (ref 3.5–5.2)
ALP SERPL-CCNC: 43 U/L (ref 40–130)
ALT SERPL-CCNC: 15 U/L (ref 5–41)
ANION GAP SERPL CALCULATED.3IONS-SCNC: 10 MMOL/L (ref 7–19)
AST SERPL-CCNC: 19 U/L (ref 5–40)
BASOPHILS # BLD: 0 K/UL (ref 0–0.2)
BASOPHILS NFR BLD: 0.4 % (ref 0–1)
BILIRUB SERPL-MCNC: 0.6 MG/DL (ref 0.2–1.2)
BUN SERPL-MCNC: 21 MG/DL (ref 6–20)
CALCIUM SERPL-MCNC: 9.4 MG/DL (ref 8.6–10)
CHLORIDE SERPL-SCNC: 105 MMOL/L (ref 98–111)
CO2 SERPL-SCNC: 25 MMOL/L (ref 22–29)
CREAT SERPL-MCNC: 1 MG/DL (ref 0.5–1.2)
EOSINOPHIL # BLD: 0 K/UL (ref 0–0.6)
EOSINOPHIL NFR BLD: 0.4 % (ref 0–5)
ERYTHROCYTE [DISTWIDTH] IN BLOOD BY AUTOMATED COUNT: 13.2 % (ref 11.5–14.5)
ERYTHROCYTE [SEDIMENTATION RATE] IN BLOOD BY WESTERGREN METHOD: 6 MM/HR (ref 0–10)
GLUCOSE SERPL-MCNC: 120 MG/DL (ref 74–109)
HCT VFR BLD AUTO: 42.1 % (ref 42–52)
HGB BLD-MCNC: 13.9 G/DL (ref 14–18)
IMM GRANULOCYTES # BLD: 0 K/UL
LYMPHOCYTES # BLD: 1 K/UL (ref 1.1–4.5)
LYMPHOCYTES NFR BLD: 14.7 % (ref 20–40)
MCH RBC QN AUTO: 30.2 PG (ref 27–31)
MCHC RBC AUTO-ENTMCNC: 33 G/DL (ref 33–37)
MCV RBC AUTO: 91.3 FL (ref 80–94)
MONOCYTES # BLD: 0.2 K/UL (ref 0–0.9)
MONOCYTES NFR BLD: 3 % (ref 0–10)
NEUTROPHILS # BLD: 5.6 K/UL (ref 1.5–7.5)
NEUTS SEG NFR BLD: 81.1 % (ref 50–65)
PLATELET # BLD AUTO: 187 K/UL (ref 130–400)
PMV BLD AUTO: 10.6 FL (ref 9.4–12.4)
POTASSIUM SERPL-SCNC: 4.3 MMOL/L (ref 3.5–5)
PROT SERPL-MCNC: 7.2 G/DL (ref 6.6–8.7)
RBC # BLD AUTO: 4.61 M/UL (ref 4.7–6.1)
SODIUM SERPL-SCNC: 140 MMOL/L (ref 136–145)
WBC # BLD AUTO: 6.9 K/UL (ref 4.8–10.8)

## 2023-09-29 DIAGNOSIS — L40.9 PSORIASIS: ICD-10-CM

## 2023-09-29 RX ORDER — CLOBETASOL PROPIONATE 0.5 MG/G
CREAM TOPICAL
Qty: 15 G | Refills: 2 | OUTPATIENT
Start: 2023-09-29

## 2023-10-09 DIAGNOSIS — L40.9 PSORIASIS: ICD-10-CM

## 2023-10-09 RX ORDER — CLOBETASOL PROPIONATE 0.5 MG/G
CREAM TOPICAL
Qty: 15 G | Refills: 2 | Status: SHIPPED | OUTPATIENT
Start: 2023-10-09

## 2023-10-09 NOTE — TELEPHONE ENCOUNTER
Casey Rodriguez called to request a refill on his medication.       Last office visit : 3/24/2022   Next office visit : Visit date not found     Requested Prescriptions     Pending Prescriptions Disp Refills    clobetasol (TEMOVATE) 0.05 % cream 15 g 2     Sig: APPLY TOPICALLY TO AFFECTED AREA TWICE A DAY

## 2024-01-18 DIAGNOSIS — L40.9 PSORIASIS: ICD-10-CM

## 2024-01-18 RX ORDER — CLOBETASOL PROPIONATE 0.5 MG/G
CREAM TOPICAL
Qty: 15 G | Refills: 2 | Status: SHIPPED | OUTPATIENT
Start: 2024-01-18

## 2024-02-23 ENCOUNTER — OFFICE VISIT (OUTPATIENT)
Dept: FAMILY MEDICINE CLINIC | Age: 34
End: 2024-02-23
Payer: COMMERCIAL

## 2024-02-23 VITALS
WEIGHT: 175 LBS | HEIGHT: 71 IN | HEART RATE: 67 BPM | SYSTOLIC BLOOD PRESSURE: 98 MMHG | OXYGEN SATURATION: 98 % | TEMPERATURE: 97.5 F | RESPIRATION RATE: 20 BRPM | BODY MASS INDEX: 24.5 KG/M2 | DIASTOLIC BLOOD PRESSURE: 60 MMHG

## 2024-02-23 DIAGNOSIS — F32.A ANXIETY AND DEPRESSION: Primary | ICD-10-CM

## 2024-02-23 DIAGNOSIS — F41.9 ANXIETY AND DEPRESSION: Primary | ICD-10-CM

## 2024-02-23 PROCEDURE — 99214 OFFICE O/P EST MOD 30 MIN: CPT | Performed by: NURSE PRACTITIONER

## 2024-02-23 RX ORDER — SERTRALINE HYDROCHLORIDE 25 MG/1
TABLET, FILM COATED ORAL
Qty: 30 TABLET | Refills: 1 | Status: SHIPPED | OUTPATIENT
Start: 2024-02-23

## 2024-02-23 ASSESSMENT — ANXIETY QUESTIONNAIRES
4. TROUBLE RELAXING: 2
3. WORRYING TOO MUCH ABOUT DIFFERENT THINGS: 1
7. FEELING AFRAID AS IF SOMETHING AWFUL MIGHT HAPPEN: 0
6. BECOMING EASILY ANNOYED OR IRRITABLE: 1
5. BEING SO RESTLESS THAT IT IS HARD TO SIT STILL: 1
GAD7 TOTAL SCORE: 8
IF YOU CHECKED OFF ANY PROBLEMS ON THIS QUESTIONNAIRE, HOW DIFFICULT HAVE THESE PROBLEMS MADE IT FOR YOU TO DO YOUR WORK, TAKE CARE OF THINGS AT HOME, OR GET ALONG WITH OTHER PEOPLE: SOMEWHAT DIFFICULT
2. NOT BEING ABLE TO STOP OR CONTROL WORRYING: 2
1. FEELING NERVOUS, ANXIOUS, OR ON EDGE: 1

## 2024-02-23 ASSESSMENT — PATIENT HEALTH QUESTIONNAIRE - PHQ9
8. MOVING OR SPEAKING SO SLOWLY THAT OTHER PEOPLE COULD HAVE NOTICED. OR THE OPPOSITE, BEING SO FIGETY OR RESTLESS THAT YOU HAVE BEEN MOVING AROUND A LOT MORE THAN USUAL: 1
SUM OF ALL RESPONSES TO PHQ QUESTIONS 1-9: 15
SUM OF ALL RESPONSES TO PHQ QUESTIONS 1-9: 15
6. FEELING BAD ABOUT YOURSELF - OR THAT YOU ARE A FAILURE OR HAVE LET YOURSELF OR YOUR FAMILY DOWN: 1
4. FEELING TIRED OR HAVING LITTLE ENERGY: 2
5. POOR APPETITE OR OVEREATING: 2
1. LITTLE INTEREST OR PLEASURE IN DOING THINGS: 2
SUM OF ALL RESPONSES TO PHQ9 QUESTIONS 1 & 2: 4
SUM OF ALL RESPONSES TO PHQ QUESTIONS 1-9: 15
10. IF YOU CHECKED OFF ANY PROBLEMS, HOW DIFFICULT HAVE THESE PROBLEMS MADE IT FOR YOU TO DO YOUR WORK, TAKE CARE OF THINGS AT HOME, OR GET ALONG WITH OTHER PEOPLE: 1
2. FEELING DOWN, DEPRESSED OR HOPELESS: 2
9. THOUGHTS THAT YOU WOULD BE BETTER OFF DEAD, OR OF HURTING YOURSELF: 1
7. TROUBLE CONCENTRATING ON THINGS, SUCH AS READING THE NEWSPAPER OR WATCHING TELEVISION: 2
3. TROUBLE FALLING OR STAYING ASLEEP: 2
SUM OF ALL RESPONSES TO PHQ QUESTIONS 1-9: 14

## 2024-02-23 ASSESSMENT — COLUMBIA-SUICIDE SEVERITY RATING SCALE - C-SSRS
2. HAVE YOU ACTUALLY HAD ANY THOUGHTS OF KILLING YOURSELF?: NO
1. WITHIN THE PAST MONTH, HAVE YOU WISHED YOU WERE DEAD OR WISHED YOU COULD GO TO SLEEP AND NOT WAKE UP?: YES
6. HAVE YOU EVER DONE ANYTHING, STARTED TO DO ANYTHING, OR PREPARED TO DO ANYTHING TO END YOUR LIFE?: NO

## 2024-02-23 ASSESSMENT — ENCOUNTER SYMPTOMS: RESPIRATORY NEGATIVE: 1

## 2024-02-23 NOTE — PROGRESS NOTES
SUBJECTIVE:    Patient ID: Rex Landa is a34 y.o. male.  Rex Landa is here today for Depression (Patient presents to discuss depression and anxiety, recent divorce, new job, process of moving from [place], grandmother . Patient is going to therapy in [facility] in [place] and she suggested medication. )  .    HPI:   HPI         Pt is here today for concerns of depression and anxiety.   He has been seeing counseling.    Changes in life---marriage, divorce, relationship then breakout, death of gma, new job, move.    Has had ideas 3 times cross mind that things would be easier if he wasn't alive, but he has no plan and states that is why he is here because he doesn't want to continue feeling that way/unhappy and not enjoying life.    Sleeping ok some weeks and not as well other weeks.  Still enjoys exercise and eats healthy.  Wt stable as of recent.     On the basis of positive PHQ-9 screening (PHQ-9 Total Score: 15), the following plan was implemented: additional evaluation and assessment performed, follow-up plan includes but not limited to: Medication management and Referral to /Specialist for evaluation and management and medication addition, genesight, continuing counseling .  Patient will follow-up in 2 week(s) with PCP.       Past Medical History:   Diagnosis Date    Colitis      Prior to Visit Medications    Medication Sig Taking? Authorizing Provider   sertraline (ZOLOFT) 25 MG tablet 1/2 po daily for 3days then increase to 1 po daily TAKE WITH FOOD Yes Adilene Gibbs APRN   clobetasol (TEMOVATE) 0.05 % cream APPLY TOPICALLY TO AFFECTED AREA TWICE A DAY Yes Cali Marks MD   vitamin D (ERGOCALCIFEROL) 1.25 MG (60963 UT) CAPS capsule Take 1 capsule by mouth once a week Yes ProviderJer MD   inFLIXimab (REMICADE) 100 MG injection Infuse intravenously Yes ProviderJer MD   Multiple Vitamins-Minerals (THERAPEUTIC MULTIVITAMIN-MINERALS) tablet Take 1

## 2024-03-04 ENCOUNTER — OFFICE VISIT (OUTPATIENT)
Dept: FAMILY MEDICINE CLINIC | Age: 34
End: 2024-03-04
Payer: COMMERCIAL

## 2024-03-04 VITALS
HEIGHT: 71 IN | OXYGEN SATURATION: 98 % | TEMPERATURE: 97.7 F | BODY MASS INDEX: 24.78 KG/M2 | SYSTOLIC BLOOD PRESSURE: 98 MMHG | RESPIRATION RATE: 20 BRPM | WEIGHT: 177 LBS | HEART RATE: 58 BPM | DIASTOLIC BLOOD PRESSURE: 60 MMHG

## 2024-03-04 DIAGNOSIS — F41.9 ANXIETY AND DEPRESSION: Primary | ICD-10-CM

## 2024-03-04 DIAGNOSIS — F32.A ANXIETY AND DEPRESSION: Primary | ICD-10-CM

## 2024-03-04 PROCEDURE — 99213 OFFICE O/P EST LOW 20 MIN: CPT | Performed by: NURSE PRACTITIONER

## 2024-03-04 RX ORDER — CLONAZEPAM 0.5 MG/1
.25-.5 TABLET ORAL 2 TIMES DAILY PRN
Qty: 10 TABLET | Refills: 0 | Status: SHIPPED | OUTPATIENT
Start: 2024-03-04 | End: 2024-03-14

## 2024-03-04 RX ORDER — BUPROPION HYDROCHLORIDE 150 MG/1
150 TABLET ORAL EVERY MORNING
Qty: 30 TABLET | Refills: 3 | Status: SHIPPED | OUTPATIENT
Start: 2024-03-04

## 2024-03-04 ASSESSMENT — ENCOUNTER SYMPTOMS: RESPIRATORY NEGATIVE: 1

## 2024-03-04 NOTE — PROGRESS NOTES
SUBJECTIVE:    Patient ID: Rex Landa is a34 y.o. male.  Rex Landa is here today for Follow-up (Patient presents for follow up on medication. Patient states, \"I felt the medication was making things worse and possible panic attacks.\" Patient had to postpone a trip so that he could discuss the medications and how he feels. ) and Discuss Medications  .    HPI:   HPI     Patient was in office on February 23, 2024 with concerns of multiple changes in his life and how he was responding emotionally.  He had already taken upon himself to get into therapy.  He reported marriage, divorce, relationship break-up, death of grandmother, new job, move had all occurred in a very short amount of time.  He did want to move forward with GeneSight testing that day which we have done and now have results back for discussion today.  However, we had opted to go ahead and begin treatment and at that time started sertraline very low dose.  He has concerns that it may have been making things worse.    We have reviewed GeneSight together today.  Within his recommended category, I have discussed possibility of using Pristiq or Wellbutrin.  In relation to his previous complaints that were primarily depression linked as well as ongoing feeling of no pleasure in doing things he used to enjoy, we will move forward with Wellbutrin.  We have discussed potential side effects and related to rare occurrence of his increased anxiety which occurred 2 days ago I am going to supply him with a very short supply of anxiety medication.  There is no intent to continue this and he is very comfortable with this prescription despite a history that he was open about.  Jed has been done.  I will hold off on UDS as well as contract as there is no intent to continue this medication.      Past Medical History:   Diagnosis Date    Colitis      Prior to Visit Medications    Medication Sig Taking? Authorizing Provider   buPROPion (WELLBUTRIN XL) 150 MG

## 2024-03-04 NOTE — PROGRESS NOTES
DELVIS was reviewed today per office protocol. Report shows No discrepancies.  Fill pattern is consistent from single provider(s) at single pharmacy(s).

## 2024-07-10 ENCOUNTER — OFFICE VISIT (OUTPATIENT)
Dept: FAMILY MEDICINE CLINIC | Age: 34
End: 2024-07-10
Payer: COMMERCIAL

## 2024-07-10 VITALS
OXYGEN SATURATION: 99 % | SYSTOLIC BLOOD PRESSURE: 102 MMHG | DIASTOLIC BLOOD PRESSURE: 62 MMHG | BODY MASS INDEX: 25.2 KG/M2 | TEMPERATURE: 97.5 F | HEIGHT: 71 IN | HEART RATE: 74 BPM | RESPIRATION RATE: 20 BRPM | WEIGHT: 180 LBS

## 2024-07-10 DIAGNOSIS — F32.A ANXIETY AND DEPRESSION: Primary | ICD-10-CM

## 2024-07-10 DIAGNOSIS — F41.9 ANXIETY AND DEPRESSION: Primary | ICD-10-CM

## 2024-07-10 DIAGNOSIS — E55.9 VITAMIN D DEFICIENCY: ICD-10-CM

## 2024-07-10 DIAGNOSIS — Z13.220 LIPID SCREENING: ICD-10-CM

## 2024-07-10 PROCEDURE — 99214 OFFICE O/P EST MOD 30 MIN: CPT | Performed by: NURSE PRACTITIONER

## 2024-07-10 RX ORDER — BUPROPION HYDROCHLORIDE 300 MG/1
300 TABLET ORAL EVERY MORNING
Qty: 90 TABLET | Refills: 1 | Status: SHIPPED | OUTPATIENT
Start: 2024-07-10

## 2024-07-10 ASSESSMENT — ENCOUNTER SYMPTOMS
RESPIRATORY NEGATIVE: 1
DIARRHEA: 0
CONSTIPATION: 0

## 2024-07-10 NOTE — PROGRESS NOTES
SUBJECTIVE:    Patient ID: Rex Landa is a34 y.o. male.  Rex Landa is here today for Medication follow up  (Patient presents for medication follow up and refill. Patient states that he is feeling better on the medication. \"More good days then bad\")  .    HPI:   HPI     Here today to follow-up on medication management for depression.  He has been taking bupropion 150 mg daily.  He does feel he has had better days than bad.  In further discussion he actually states it is not a whole day that is bad it just may be moments but overall that has improved. He has thought of option to increase dose.  We have discussed this option.   No ideas of self harm reported and no further negative thinking.   Work is going well.  Enjoying time outdoors.    Sleeping well.             Past Medical History:   Diagnosis Date    Colitis      Prior to Visit Medications    Medication Sig Taking? Authorizing Provider   buPROPion (WELLBUTRIN XL) 300 MG extended release tablet Take 1 tablet by mouth every morning Yes Adilene Gibbs APRN   clobetasol (TEMOVATE) 0.05 % cream APPLY TOPICALLY TO AFFECTED AREA TWICE A DAY Yes Clai Marks MD   vitamin D (ERGOCALCIFEROL) 1.25 MG (05334 UT) CAPS capsule Take 1 capsule by mouth once a week Yes ProviderJer MD   inFLIXimab (REMICADE) 100 MG injection Infuse intravenously Yes ProviderJer MD   Multiple Vitamins-Minerals (THERAPEUTIC MULTIVITAMIN-MINERALS) tablet Take 1 tablet by mouth daily Yes Jer Boyer MD   clonazePAM (KLONOPIN) 0.5 MG tablet Take 0.5-1 tablets by mouth 2 times daily as needed for Anxiety for up to 10 days. Max Daily Amount: 1 mg  Adilene Gibbs APRN     No Known Allergies  Past Surgical History:   Procedure Laterality Date    ANTERIOR CRUCIATE LIGAMENT REPAIR Left     COLONOSCOPY N/A 10/21/2020    Dr Hickey-Left-sided ulcerative colitis Browne score 7, Proctocolitis Browne score 7, repeat in 6 wks    CYST REMOVAL      ON LP    WISDOM TOOTH

## 2024-08-05 LAB
ALBUMIN SERPL-MCNC: 4.7 G/DL (ref 3.5–5.2)
ALP SERPL-CCNC: 42 U/L (ref 40–130)
ALT SERPL-CCNC: 21 U/L (ref 5–41)
ANION GAP SERPL CALCULATED.3IONS-SCNC: 10 MMOL/L (ref 7–19)
AST SERPL-CCNC: 26 U/L (ref 5–40)
BASOPHILS # BLD: 0.1 K/UL (ref 0–0.2)
BASOPHILS NFR BLD: 0.7 % (ref 0–1)
BILIRUB SERPL-MCNC: 0.5 MG/DL (ref 0.2–1.2)
BUN SERPL-MCNC: 18 MG/DL (ref 6–20)
CALCIUM SERPL-MCNC: 9.9 MG/DL (ref 8.6–10)
CHLORIDE SERPL-SCNC: 104 MMOL/L (ref 98–111)
CO2 SERPL-SCNC: 27 MMOL/L (ref 22–29)
CRP SERPL HS-MCNC: <0.3 MG/DL (ref 0–0.5)
EOSINOPHIL # BLD: 0.1 K/UL (ref 0–0.6)
EOSINOPHIL NFR BLD: 0.9 % (ref 0–5)
ERYTHROCYTE [DISTWIDTH] IN BLOOD BY AUTOMATED COUNT: 13.5 % (ref 11.5–14.5)
ERYTHROCYTE [SEDIMENTATION RATE] IN BLOOD BY WESTERGREN METHOD: 6 MM/HR (ref 0–10)
GLUCOSE SERPL-MCNC: 76 MG/DL (ref 74–109)
HCT VFR BLD AUTO: 41.7 % (ref 42–52)
HGB BLD-MCNC: 13.3 G/DL (ref 14–18)
IMM GRANULOCYTES # BLD: 0 K/UL
LYMPHOCYTES # BLD: 1.2 K/UL (ref 1.1–4.5)
LYMPHOCYTES NFR BLD: 16.6 % (ref 20–40)
MCH RBC QN AUTO: 29.4 PG (ref 27–31)
MCHC RBC AUTO-ENTMCNC: 31.9 G/DL (ref 33–37)
MCV RBC AUTO: 92.1 FL (ref 80–94)
MONOCYTES # BLD: 0.3 K/UL (ref 0–0.9)
MONOCYTES NFR BLD: 3.8 % (ref 0–10)
NEUTROPHILS # BLD: 5.8 K/UL (ref 1.5–7.5)
NEUTS SEG NFR BLD: 77.5 % (ref 50–65)
PLATELET # BLD AUTO: 194 K/UL (ref 130–400)
PMV BLD AUTO: 10 FL (ref 9.4–12.4)
POTASSIUM SERPL-SCNC: 4.6 MMOL/L (ref 3.5–5)
PROT SERPL-MCNC: 7.4 G/DL (ref 6.6–8.7)
RBC # BLD AUTO: 4.53 M/UL (ref 4.7–6.1)
SODIUM SERPL-SCNC: 141 MMOL/L (ref 136–145)
WBC # BLD AUTO: 7.5 K/UL (ref 4.8–10.8)

## 2024-11-20 DIAGNOSIS — L40.9 PSORIASIS: ICD-10-CM

## 2024-11-21 DIAGNOSIS — F32.A ANXIETY AND DEPRESSION: ICD-10-CM

## 2024-11-21 DIAGNOSIS — F41.9 ANXIETY AND DEPRESSION: ICD-10-CM

## 2024-11-21 NOTE — TELEPHONE ENCOUNTER
Rex Landa called to request a refill on his medication.      Last office visit : 7/10/2024   Next office visit : Visit date not found     Requested Prescriptions     Pending Prescriptions Disp Refills    buPROPion (WELLBUTRIN XL) 300 MG extended release tablet 90 tablet 1     Sig: Take 1 tablet by mouth every morning            Arabella Shaffer MA

## 2024-11-22 RX ORDER — BUPROPION HYDROCHLORIDE 300 MG/1
300 TABLET ORAL EVERY MORNING
Qty: 90 TABLET | Refills: 1 | Status: SHIPPED | OUTPATIENT
Start: 2024-11-22

## 2024-11-22 RX ORDER — CLOBETASOL PROPIONATE 0.5 MG/G
CREAM TOPICAL
Qty: 30 G | Refills: 2 | Status: SHIPPED | OUTPATIENT
Start: 2024-11-22

## 2024-11-22 NOTE — TELEPHONE ENCOUNTER
Rex Shahga called to request a refill on his medication.      Last office visit : 7/10/2024   Next office visit : 11/21/2024     Requested Prescriptions     Pending Prescriptions Disp Refills    clobetasol (TEMOVATE) 0.05 % cream [Pharmacy Med Name: CLOBETASOL 0.05% CREAM 0.05 Cream] 30 g 2     Sig: APPLY TO AFFECTED AREA TWICE A DAY AS DIRECTED            Sonia Kuhn LPN

## 2025-03-18 ENCOUNTER — OFFICE VISIT (OUTPATIENT)
Age: 35
End: 2025-03-18
Payer: COMMERCIAL

## 2025-03-18 ENCOUNTER — RESULTS FOLLOW-UP (OUTPATIENT)
Age: 35
End: 2025-03-18

## 2025-03-18 VITALS
OXYGEN SATURATION: 96 % | BODY MASS INDEX: 25.76 KG/M2 | TEMPERATURE: 98.7 F | DIASTOLIC BLOOD PRESSURE: 80 MMHG | HEART RATE: 82 BPM | WEIGHT: 184 LBS | SYSTOLIC BLOOD PRESSURE: 124 MMHG | HEIGHT: 71 IN

## 2025-03-18 DIAGNOSIS — R30.0 DYSURIA: ICD-10-CM

## 2025-03-18 DIAGNOSIS — R30.0 DYSURIA: Primary | ICD-10-CM

## 2025-03-18 LAB
ALBUMIN SERPL-MCNC: 4.9 G/DL (ref 3.5–5.2)
ALP SERPL-CCNC: 37 U/L (ref 40–129)
ALT SERPL-CCNC: 25 U/L (ref 10–50)
ANION GAP SERPL CALCULATED.3IONS-SCNC: 12 MMOL/L (ref 8–16)
APPEARANCE FLUID: CLEAR
AST SERPL-CCNC: 29 U/L (ref 10–50)
BILIRUB SERPL-MCNC: 0.6 MG/DL (ref 0.2–1.2)
BILIRUBIN, POC: NORMAL
BLOOD URINE, POC: NORMAL
BUN SERPL-MCNC: 19 MG/DL (ref 6–20)
C TRACH DNA UR QL NAA+PROBE: NOT DETECTED
CALCIUM SERPL-MCNC: 9.7 MG/DL (ref 8.6–10)
CHLORIDE SERPL-SCNC: 100 MMOL/L (ref 98–107)
CLARITY, POC: CLEAR
CO2 SERPL-SCNC: 27 MMOL/L (ref 22–29)
COLOR, POC: YELLOW
CREAT SERPL-MCNC: 1.1 MG/DL (ref 0.7–1.2)
ERYTHROCYTE [DISTWIDTH] IN BLOOD BY AUTOMATED COUNT: 13 % (ref 11.5–14.5)
GLUCOSE SERPL-MCNC: 96 MG/DL (ref 70–99)
GLUCOSE URINE, POC: NORMAL MG/DL
HCT VFR BLD AUTO: 41.2 % (ref 42–52)
HGB BLD-MCNC: 13.9 G/DL (ref 14–18)
KETONES, POC: NORMAL MG/DL
LEUKOCYTE EST, POC: NORMAL
MCH RBC QN AUTO: 30.2 PG (ref 27–31)
MCHC RBC AUTO-ENTMCNC: 33.7 G/DL (ref 33–37)
MCV RBC AUTO: 89.4 FL (ref 80–94)
N GONORRHOEA DNA UR QL NAA+PROBE: NOT DETECTED
NITRITE, POC: NORMAL
PH, POC: 7
PLATELET # BLD AUTO: 228 K/UL (ref 130–400)
PMV BLD AUTO: 10.7 FL (ref 9.4–12.4)
POTASSIUM SERPL-SCNC: 3.7 MMOL/L (ref 3.5–5.1)
PROT SERPL-MCNC: 7.8 G/DL (ref 6.4–8.3)
PROTEIN, POC: NORMAL MG/DL
RBC # BLD AUTO: 4.61 M/UL (ref 4.7–6.1)
SODIUM SERPL-SCNC: 139 MMOL/L (ref 136–145)
SPECIFIC GRAVITY, POC: 1.01
T VAGINALIS DNA UR QL NAA+PROBE: NOT DETECTED
UROBILINOGEN, POC: NORMAL MG/DL
WBC # BLD AUTO: 7.4 K/UL (ref 4.8–10.8)

## 2025-03-18 PROCEDURE — 99213 OFFICE O/P EST LOW 20 MIN: CPT | Performed by: FAMILY MEDICINE

## 2025-03-18 PROCEDURE — 81002 URINALYSIS NONAUTO W/O SCOPE: CPT | Performed by: FAMILY MEDICINE

## 2025-03-18 SDOH — ECONOMIC STABILITY: FOOD INSECURITY: WITHIN THE PAST 12 MONTHS, YOU WORRIED THAT YOUR FOOD WOULD RUN OUT BEFORE YOU GOT MONEY TO BUY MORE.: NEVER TRUE

## 2025-03-18 SDOH — ECONOMIC STABILITY: FOOD INSECURITY: WITHIN THE PAST 12 MONTHS, THE FOOD YOU BOUGHT JUST DIDN'T LAST AND YOU DIDN'T HAVE MONEY TO GET MORE.: NEVER TRUE

## 2025-03-18 ASSESSMENT — PATIENT HEALTH QUESTIONNAIRE - PHQ9
8. MOVING OR SPEAKING SO SLOWLY THAT OTHER PEOPLE COULD HAVE NOTICED. OR THE OPPOSITE, BEING SO FIGETY OR RESTLESS THAT YOU HAVE BEEN MOVING AROUND A LOT MORE THAN USUAL: NOT AT ALL
6. FEELING BAD ABOUT YOURSELF - OR THAT YOU ARE A FAILURE OR HAVE LET YOURSELF OR YOUR FAMILY DOWN: NOT AT ALL
SUM OF ALL RESPONSES TO PHQ QUESTIONS 1-9: 0
10. IF YOU CHECKED OFF ANY PROBLEMS, HOW DIFFICULT HAVE THESE PROBLEMS MADE IT FOR YOU TO DO YOUR WORK, TAKE CARE OF THINGS AT HOME, OR GET ALONG WITH OTHER PEOPLE: NOT DIFFICULT AT ALL
SUM OF ALL RESPONSES TO PHQ QUESTIONS 1-9: 0
4. FEELING TIRED OR HAVING LITTLE ENERGY: NOT AT ALL
SUM OF ALL RESPONSES TO PHQ QUESTIONS 1-9: 0
1. LITTLE INTEREST OR PLEASURE IN DOING THINGS: NOT AT ALL
7. TROUBLE CONCENTRATING ON THINGS, SUCH AS READING THE NEWSPAPER OR WATCHING TELEVISION: NOT AT ALL
9. THOUGHTS THAT YOU WOULD BE BETTER OFF DEAD, OR OF HURTING YOURSELF: NOT AT ALL
5. POOR APPETITE OR OVEREATING: NOT AT ALL
3. TROUBLE FALLING OR STAYING ASLEEP: NOT AT ALL
SUM OF ALL RESPONSES TO PHQ QUESTIONS 1-9: 0
2. FEELING DOWN, DEPRESSED OR HOPELESS: NOT AT ALL

## 2025-03-18 ASSESSMENT — ENCOUNTER SYMPTOMS
ALLERGIC/IMMUNOLOGIC NEGATIVE: 1
EYES NEGATIVE: 1
GASTROINTESTINAL NEGATIVE: 1
RESPIRATORY NEGATIVE: 1

## 2025-03-18 NOTE — PROGRESS NOTES
and dry.      Coloration: Skin is not jaundiced or pale.      Findings: No lesion or rash.      Nails: There is no clubbing.   Neurological:      Mental Status: He is alert and oriented to person, place, and time.      Cranial Nerves: No facial asymmetry.      Motor: No weakness or tremor.      Coordination: Coordination normal.      Gait: Gait normal.      Deep Tendon Reflexes: Reflexes are normal and symmetric.   Psychiatric:         Attention and Perception: Attention normal.         Mood and Affect: Mood normal.         Speech: Speech normal.         Behavior: Behavior normal.         Thought Content: Thought content normal.         Cognition and Memory: Memory normal.         Judgment: Judgment normal.        /80 (BP Site: Left Upper Arm, Patient Position: Sitting, BP Cuff Size: Medium Adult)   Pulse 82   Temp 98.7 °F (37.1 °C) (Temporal)   Ht 1.803 m (5' 11\")   Wt 83.5 kg (184 lb)   SpO2 96%   BMI 25.66 kg/m²      ASSESSMENT:     Diagnosis Orders   1. Dysuria  POCT Urinalysis no Micro    Culture, Urine    Chlamydia/N. Gonorrhoeae/T.Vaginalis    CBC    Comprehensive Metabolic Panel           PLAN:    1.  Urine culture and STD test if negative and symptoms continue may benefit from urology consultation.

## 2025-03-20 LAB — BACTERIA UR CULT: NORMAL

## 2025-05-19 DIAGNOSIS — F32.A ANXIETY AND DEPRESSION: ICD-10-CM

## 2025-05-19 DIAGNOSIS — F41.9 ANXIETY AND DEPRESSION: ICD-10-CM

## 2025-05-19 RX ORDER — BUPROPION HYDROCHLORIDE 300 MG/1
300 TABLET ORAL EVERY MORNING
Qty: 90 TABLET | Refills: 0 | Status: SHIPPED | OUTPATIENT
Start: 2025-05-19

## 2025-06-27 DIAGNOSIS — F41.9 ANXIETY AND DEPRESSION: ICD-10-CM

## 2025-06-27 DIAGNOSIS — E55.9 VITAMIN D DEFICIENCY: ICD-10-CM

## 2025-06-27 DIAGNOSIS — Z13.220 LIPID SCREENING: ICD-10-CM

## 2025-06-27 DIAGNOSIS — F32.A ANXIETY AND DEPRESSION: ICD-10-CM

## 2025-06-27 LAB
25(OH)D3 SERPL-MCNC: 37 NG/ML
CHOLEST SERPL-MCNC: 328 MG/DL (ref 0–199)
CRP SERPL-MCNC: <3 MG/L (ref 0–5)
HDLC SERPL-MCNC: 96 MG/DL (ref 40–60)
LDLC SERPL CALC-MCNC: 210 MG/DL
T4 FREE SERPL-MCNC: 1.14 NG/DL (ref 0.93–1.7)
TRIGL SERPL-MCNC: 110 MG/DL (ref 0–149)
TSH SERPL DL<=0.005 MIU/L-ACNC: 1.12 UIU/ML (ref 0.27–4.2)
VIT B12 SERPL-MCNC: 675 PG/ML (ref 232–1245)

## 2025-07-03 ENCOUNTER — RESULTS FOLLOW-UP (OUTPATIENT)
Age: 35
End: 2025-07-03

## 2025-07-07 DIAGNOSIS — E78.5 HYPERLIPIDEMIA, UNSPECIFIED HYPERLIPIDEMIA TYPE: Primary | ICD-10-CM

## 2025-08-28 DIAGNOSIS — L40.9 PSORIASIS: ICD-10-CM

## 2025-08-28 RX ORDER — CLOBETASOL PROPIONATE 0.5 MG/G
CREAM TOPICAL
Qty: 30 G | Refills: 2 | Status: SHIPPED | OUTPATIENT
Start: 2025-08-28

## (undated) DEVICE — FORCEPS BX L240CM JAW DIA2.4MM ORNG L CAP W/ NDL DISP RAD

## (undated) DEVICE — ENDO KIT,LOURDES HOSPITAL: Brand: MEDLINE INDUSTRIES, INC.